# Patient Record
Sex: FEMALE | Race: WHITE | Employment: FULL TIME | URBAN - NONMETROPOLITAN AREA
[De-identification: names, ages, dates, MRNs, and addresses within clinical notes are randomized per-mention and may not be internally consistent; named-entity substitution may affect disease eponyms.]

---

## 2021-05-04 ENCOUNTER — HOSPITAL ENCOUNTER (EMERGENCY)
Age: 41
Discharge: HOME | End: 2021-05-04
Payer: COMMERCIAL

## 2021-05-04 VITALS
OXYGEN SATURATION: 97 % | WEIGHT: 135 LBS | HEART RATE: 95 BPM | BODY MASS INDEX: 21.69 KG/M2 | RESPIRATION RATE: 18 BRPM | TEMPERATURE: 98.9 F | HEIGHT: 66 IN | DIASTOLIC BLOOD PRESSURE: 82 MMHG | SYSTOLIC BLOOD PRESSURE: 131 MMHG

## 2021-05-04 DIAGNOSIS — H10.9 CONJUNCTIVITIS OF RIGHT EYE, UNSPECIFIED CONJUNCTIVITIS TYPE: ICD-10-CM

## 2021-05-04 DIAGNOSIS — J06.9 VIRAL URI WITH COUGH: ICD-10-CM

## 2021-05-04 DIAGNOSIS — Z87.09 PERSONAL HISTORY OF ASTHMA: ICD-10-CM

## 2021-05-04 DIAGNOSIS — R09.82 PND (POST-NASAL DRIP): ICD-10-CM

## 2021-05-04 DIAGNOSIS — J01.00 ACUTE NON-RECURRENT MAXILLARY SINUSITIS: Primary | ICD-10-CM

## 2021-05-04 PROCEDURE — 99203 OFFICE O/P NEW LOW 30 MIN: CPT

## 2021-05-04 PROCEDURE — 99202 OFFICE O/P NEW SF 15 MIN: CPT | Performed by: NURSE PRACTITIONER

## 2021-05-04 PROCEDURE — 99213 OFFICE O/P EST LOW 20 MIN: CPT

## 2021-05-04 RX ORDER — AZITHROMYCIN 250 MG/1
TABLET, FILM COATED ORAL
Qty: 6 TABLET | Refills: 0 | Status: SHIPPED | OUTPATIENT
Start: 2021-05-04 | End: 2021-06-15

## 2021-05-04 RX ORDER — ALBUTEROL SULFATE 90 UG/1
2 AEROSOL, METERED RESPIRATORY (INHALATION) EVERY 4 HOURS PRN
Qty: 1 INHALER | Refills: 0 | Status: SHIPPED | OUTPATIENT
Start: 2021-05-04 | End: 2022-07-05 | Stop reason: SDUPTHER

## 2021-05-04 RX ORDER — MOXIFLOXACIN 5 MG/ML
1 SOLUTION/ DROPS OPHTHALMIC 3 TIMES DAILY
Qty: 1 BOTTLE | Refills: 0 | Status: SHIPPED | OUTPATIENT
Start: 2021-05-04 | End: 2021-05-11

## 2021-05-04 RX ORDER — PREDNISONE 20 MG/1
20 TABLET ORAL DAILY
Qty: 5 TABLET | Refills: 0 | Status: SHIPPED | OUTPATIENT
Start: 2021-05-04 | End: 2021-05-09

## 2021-05-04 RX ORDER — ALPRAZOLAM 0.5 MG/1
0.5 TABLET ORAL 2 TIMES DAILY PRN
COMMUNITY
End: 2021-12-17 | Stop reason: ALTCHOICE

## 2021-05-04 ASSESSMENT — ENCOUNTER SYMPTOMS
EYE DISCHARGE: 1
RHINORRHEA: 1
TROUBLE SWALLOWING: 0
WHEEZING: 0
NAUSEA: 0
VOMITING: 0
SHORTNESS OF BREATH: 0
BACK PAIN: 0
SORE THROAT: 0
COUGH: 1
DIARRHEA: 0
SINUS PRESSURE: 1

## 2021-05-04 ASSESSMENT — PAIN DESCRIPTION - ONSET: ONSET: GRADUAL

## 2021-05-04 ASSESSMENT — VISUAL ACUITY
OD: 20/20
OU: 20/20

## 2021-05-04 ASSESSMENT — PAIN DESCRIPTION - PROGRESSION: CLINICAL_PROGRESSION: GRADUALLY WORSENING

## 2021-05-04 ASSESSMENT — PAIN DESCRIPTION - FREQUENCY: FREQUENCY: INTERMITTENT

## 2021-05-04 ASSESSMENT — PAIN DESCRIPTION - LOCATION: LOCATION: HEAD

## 2021-05-04 ASSESSMENT — PAIN DESCRIPTION - DESCRIPTORS: DESCRIPTORS: PRESSURE

## 2021-05-04 ASSESSMENT — PAIN DESCRIPTION - PAIN TYPE: TYPE: ACUTE PAIN

## 2021-05-04 NOTE — ED NOTES
No change in patients condition. Discharge instructions and prescriptions discussed with pt. Pt. Verbalized understanding of info given and ambulated to exit in stable condition.       Derek Rucker RN  05/04/21 6603

## 2021-06-15 ENCOUNTER — TELEPHONE (OUTPATIENT)
Dept: FAMILY MEDICINE CLINIC | Age: 41
End: 2021-06-15

## 2021-06-15 ENCOUNTER — OFFICE VISIT (OUTPATIENT)
Dept: FAMILY MEDICINE CLINIC | Age: 41
End: 2021-06-15
Payer: COMMERCIAL

## 2021-06-15 VITALS
OXYGEN SATURATION: 99 % | RESPIRATION RATE: 14 BRPM | WEIGHT: 135.6 LBS | DIASTOLIC BLOOD PRESSURE: 82 MMHG | HEART RATE: 86 BPM | HEIGHT: 66 IN | BODY MASS INDEX: 21.79 KG/M2 | SYSTOLIC BLOOD PRESSURE: 124 MMHG | TEMPERATURE: 96.9 F

## 2021-06-15 DIAGNOSIS — G60.9 HEREDITARY AND IDIOPATHIC PERIPHERAL NEUROPATHY: ICD-10-CM

## 2021-06-15 DIAGNOSIS — R53.83 FATIGUE, UNSPECIFIED TYPE: ICD-10-CM

## 2021-06-15 DIAGNOSIS — F41.1 GENERALIZED ANXIETY DISORDER WITH PANIC ATTACKS: ICD-10-CM

## 2021-06-15 DIAGNOSIS — F41.0 GENERALIZED ANXIETY DISORDER WITH PANIC ATTACKS: ICD-10-CM

## 2021-06-15 DIAGNOSIS — Z79.899 CONTROLLED SUBSTANCE AGREEMENT SIGNED: ICD-10-CM

## 2021-06-15 DIAGNOSIS — F33.0 MILD EPISODE OF RECURRENT MAJOR DEPRESSIVE DISORDER (HCC): ICD-10-CM

## 2021-06-15 DIAGNOSIS — N92.6 MISSED MENSES: ICD-10-CM

## 2021-06-15 DIAGNOSIS — Z76.89 ENCOUNTER TO ESTABLISH CARE: Primary | ICD-10-CM

## 2021-06-15 DIAGNOSIS — R10.32 LLQ ABDOMINAL PAIN: ICD-10-CM

## 2021-06-15 PROBLEM — F41.9 ANXIETY: Status: RESOLVED | Noted: 2021-06-15 | Resolved: 2021-06-15

## 2021-06-15 PROBLEM — F41.9 ANXIETY: Status: ACTIVE | Noted: 2021-06-15

## 2021-06-15 LAB
AMPHETAMINE+METHAMPHETAMINE URINE SCREEN: NEGATIVE
BARBITURATE QUANTITATIVE URINE: NEGATIVE
BENZODIAZEPINE QUANTITATIVE URINE: NEGATIVE
CANNABINOID QUANTITATIVE URINE: NEGATIVE
COCAINE METABOLITE QUANTITATIVE URINE: NEGATIVE
OPIATES, URINE: NEGATIVE
OXYCODONE: NEGATIVE
PHENCYCLIDINE QUANTITATIVE URINE: NEGATIVE

## 2021-06-15 PROCEDURE — 99204 OFFICE O/P NEW MOD 45 MIN: CPT | Performed by: NURSE PRACTITIONER

## 2021-06-15 RX ORDER — M-VIT,TX,IRON,MINS/CALC/FOLIC 27MG-0.4MG
1 TABLET ORAL DAILY
COMMUNITY

## 2021-06-15 RX ORDER — PAROXETINE 10 MG/1
10 TABLET, FILM COATED ORAL DAILY
Qty: 30 TABLET | Refills: 3 | Status: SHIPPED | OUTPATIENT
Start: 2021-06-15 | End: 2021-12-17 | Stop reason: ALTCHOICE

## 2021-06-15 SDOH — ECONOMIC STABILITY: FOOD INSECURITY: WITHIN THE PAST 12 MONTHS, THE FOOD YOU BOUGHT JUST DIDN'T LAST AND YOU DIDN'T HAVE MONEY TO GET MORE.: PATIENT DECLINED

## 2021-06-15 SDOH — ECONOMIC STABILITY: FOOD INSECURITY: WITHIN THE PAST 12 MONTHS, YOU WORRIED THAT YOUR FOOD WOULD RUN OUT BEFORE YOU GOT MONEY TO BUY MORE.: PATIENT DECLINED

## 2021-06-15 ASSESSMENT — PATIENT HEALTH QUESTIONNAIRE - PHQ9
2. FEELING DOWN, DEPRESSED OR HOPELESS: 0
SUM OF ALL RESPONSES TO PHQ QUESTIONS 1-9: 0
1. LITTLE INTEREST OR PLEASURE IN DOING THINGS: 0
SUM OF ALL RESPONSES TO PHQ9 QUESTIONS 1 & 2: 0

## 2021-06-15 ASSESSMENT — SOCIAL DETERMINANTS OF HEALTH (SDOH): HOW HARD IS IT FOR YOU TO PAY FOR THE VERY BASICS LIKE FOOD, HOUSING, MEDICAL CARE, AND HEATING?: PATIENT DECLINED

## 2021-06-15 NOTE — PROGRESS NOTES
found for: CHOLHDLRATIO    DM Screen - No results found for: LABA1C  No results found for: EAG  Colon Cancer Screening - 47  Lung Cancer Screening (Age 54 to [de-identified] with 30 pack year hx, current smoker or quit within past 15 years) - n/a    Tetanus - needs  Influenza Vaccine - yearly  Pneumonia Vaccine - 65  Zostavax - 50   HPV Vaccine - n/a    Breast Cancer Screening - 50  Cervical Cancer Screening - per OB  Osteoporosis Screening - 64  Chlamydia Screen - n/a    PSA testing discussion - n/a  AAA Screening - n/a    Falls screening - n/a    Current Outpatient Medications   Medication Sig Dispense Refill    Multiple Vitamins-Minerals (THERAPEUTIC MULTIVITAMIN-MINERALS) tablet Take 1 tablet by mouth daily      PARoxetine (PAXIL) 10 MG tablet Take 1 tablet by mouth daily 30 tablet 3    ALPRAZolam (XANAX) 0.5 MG tablet Take 0.5 mg by mouth 2 times daily as needed for Anxiety.  albuterol sulfate  (90 Base) MCG/ACT inhaler Inhale 2 puffs into the lungs every 4 hours as needed for Wheezing or Shortness of Breath 1 Inhaler 0     No current facility-administered medications for this visit. Orders Placed This Encounter   Medications    PARoxetine (PAXIL) 10 MG tablet     Sig: Take 1 tablet by mouth daily     Dispense:  30 tablet     Refill:  3         All medications reviewed and reconciled, including OTC and herbal medications. Updated list given to patient.        Patient Active Problem List   Diagnosis    Hereditary and idiopathic peripheral neuropathy    Generalized anxiety disorder with panic attacks    Mild episode of recurrent major depressive disorder ([facility] [address]       Past Medical History:   Diagnosis Date    Anxiety     Hereditary and idiopathic peripheral neuropathy 6/15/2021    Neuropathy     feet and legs at night        Past Surgical History:   Procedure Laterality Date    OVARIAN CYST DRAINAGE         Allergies   Allergen Reactions    Other Shortness Of Breath     Cannot remember name of medicine but was an antibiotic \"I just remember what I can take\"        Social History     Socioeconomic History    Marital status:      Spouse name: Not on file    Number of children: Not on file    Years of education: Not on file    Highest education level: Not on file   Occupational History    Not on file   Tobacco Use    Smoking status: Never Smoker    Smokeless tobacco: Never Used   Vaping Use    Vaping Use: Every day    Substances: Nicotine    Devices: Pre-filled or refillable cartridge   Substance and Sexual Activity    Alcohol use: Not Currently    Drug use: Not on file    Sexual activity: Not on file   Other Topics Concern    Not on file   Social History Narrative    Not on file     Social Determinants of Health     Financial Resource Strain: Unknown    Difficulty of Paying Living Expenses: Patient refused   Food Insecurity: Unknown    Worried About [address] in the Last Year: Patient refused    [address] in the Last Year: Patient refused   Transportation Needs:     Lack of Transportation (Medical):      Lack of Transportation (Non-Medical):    Physical Activity:     Days of Exercise per Week:     Minutes of Exercise per Session:    Stress:     Feeling of Stress :    Social Connections:     Frequency of Communication with Friends and Family:     Frequency of Social Gatherings with Friends and Family:     Attends Holiness Services:     Active Member of Clubs or Organizations:     Attends Club or Organization Meetings:     Marital Status:    Intimate Partner Violence:     Fear of Current or Ex-Partner:     Emotionally Abused:     Physically Abused:     Sexually Abused:        Family History   Problem Relation Age of Onset    Thyroid Disease Mother     COPD Father     Asthma Father     Bipolar Disorder Father          I have reviewed the patient's past medical history, past surgical history, allergies, medications, social and family history and I have made updates where appropriate. Review of Systems  Positive responses are highlighted in bold    Constitutional:  Fever, Chills, Night Sweats, Fatigue, Unexpected changes in weight  Eyes:  Eye discharge, Eye pain, Eye redness, Visual disturbances   HENT:  Ear pain, Tinnitus, Nosebleeds, Trouble swallowing, Hearing loss, Sore throat  Cardiovascular:  Chest Pain, Palpitations, Orthopnea, Paroxysmal Nocturnal Dyspnea  Respiratory:  Cough, Wheezing, Shortness of breath, Chest tightness, Apnea  Gastrointestinal:  Nausea, Vomiting, Diarrhea, Constipation, Heartburn, Blood in stool  Genitourinary:  Difficulty or painful urination, Flank pain, Change in frequency, Urgency  Skin:  Color change, Rash, Itching, Wound  Psychiatric:  Hallucinations, Anxiety, Depression, Suicidal ideation  Hematological:  Enlarged glands, Easy bleeding, Easily bruising  Musculoskeletal:  Joint pain, Back pain, Gait problems, Joint swelling, Myalgias  Neurological:  Dizziness, Headaches, Presyncope, Numbness, Seizures, Tremors  Allergy:  Environmental allergies, Food allergies  Endocrine:  Heat Intolerance, Cold Intolerance, Polydipsia, Polyphagia, Polyuria      PHYSICAL EXAM:  Vitals:    06/15/21 1244   BP: 124/82   Pulse: 86   Resp: 14   Temp: 96.9 °F (36.1 °C)   TempSrc: Infrared   SpO2: 99%   Weight: 135 lb 9.6 oz (61.5 kg)   Height: 5' 6\" (1.676 m)     Body mass index is 21.89 kg/m². VS Reviewed  General Appearance: A&O x 3, No acute distress,well developed and well- nourished  Head: normocephalic and atraumatic  Eyes: pupils equal, round, and reactive to light, extraocular eye movements intact, conjunctivae and eye lids without erythema  Neck: supple and non-tender without mass, no thyromegaly or thyroid nodules, no cervical lymphadenopathy  Pulmonary/Chest: clear to auscultation bilaterally- no wheezes, rales or rhonchi, normal air movement, no respiratory distress or retractions  Cardiovascular: S1 and S2 auscultated w/ RRR. No murmurs, rubs, clicks, or gallops, distal pulses intact. Abdomen: soft, mild tenderness LUQ, non-distended, bowl sounds physiologic,  no rebound or guarding, no masses or hernias noted. Liver and spleen without enlargement. Extremities: no cyanosis, clubbing or edema of the lower extremities  Musculoskeletal: No joint swelling or gross deformity   Neuro:  Alert, 5/5 strength globally and symmetrically  Psych: Affect appropriate. Mood normal. Thought process is normal without evidence of depression or psychosis. Good insight and appropriate interaction. Cognition and memory appear to be intact. Skin: warm and dry, no rash or erythema  Lymph:  No cervical, auricular or supraclavicular lymph nodes palpated    ASSESSMENT & PLAN  Elicia Sheth was seen today for new patient, fatigue and abdominal pain. Diagnoses and all orders for this visit:    Encounter to establish care    Hereditary and idiopathic peripheral neuropathy    LLQ abdominal pain  -     HCG, Serum, Qualitative; Future  -     US NON OB TRANSVAGINAL; Future  -     US PELVIS COMPLETE; Future    Missed menses  -     HCG, Serum, Qualitative; Future  -     US NON OB TRANSVAGINAL; Future  -     US PELVIS COMPLETE; Future    Generalized anxiety disorder with panic attacks  -     Urine Drug Screen; Future  -     PARoxetine (PAXIL) 10 MG tablet; Take 1 tablet by mouth daily    Mild episode of recurrent major depressive disorder (HCC)  -     Urine Drug Screen; Future  -     PARoxetine (PAXIL) 10 MG tablet; Take 1 tablet by mouth daily    Controlled substance agreement signed  -     Urine Drug Screen; Future    Fatigue, unspecified type  -     Comprehensive Metabolic Panel; Future  -     CBC With Auto Differential; Future  -     TSH With Reflex Ft4; Future      - obtain labs and pelvic ultrasound, including transvaginal US  - advised her that I will not prescribe Xanax alone for anxiety due to it being highly addictive and abusive.  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 - will start Paxil for anxiety to hopefully decrease the amounts of Xanax she is requiring  - currently not using anything for the neuropathy, does not want Gabapentin due to side effects and it being \"addictive\". Is apparently also using Xanax at night which she states helps with the pain    Return in about 4 weeks (around 7/13/2021) for anxiety/depression. Controlled Substance Monitoring:    Acute and Chronic Pain Monitoring:   RX Monitoring 6/15/2021   Periodic Controlled Substance Monitoring Random urine drug screen sent today. ;Potential drug abuse or diversion identified, see note documentation. DISPOSITION    Return in about 4 weeks (around 7/13/2021) for anxiety/depression. Ani Piedra released without restrictions. PATIENT COUNSELING    Counseling was provided today regarding the following topics: Healthy eating habits, Regular exercise, substance abuse and healthy sleep habits. Ani Piedra received counseling on the following healthy behaviors: medication adherence    Patient given educational materials on: See Attached    I have instructed Ani Piedra to complete a self tracking handout on none and instructed them to bring it with them to her next appointment. Barriers to learning and self management: none    Discussed use, benefit, and side effects of prescribed medications. Barriers to medication compliance addressed. All patient questions answered. Pt voiced understanding.        Electronically signed by SAE Mcmullen CNP on 6/15/2021 at 1:42 PM

## 2021-06-16 ENCOUNTER — TELEPHONE (OUTPATIENT)
Dept: FAMILY MEDICINE CLINIC | Age: 41
End: 2021-06-16

## 2021-06-16 DIAGNOSIS — F41.1 GENERALIZED ANXIETY DISORDER WITH PANIC ATTACKS: Primary | ICD-10-CM

## 2021-06-16 DIAGNOSIS — F41.0 GENERALIZED ANXIETY DISORDER WITH PANIC ATTACKS: Primary | ICD-10-CM

## 2021-06-16 RX ORDER — ALPRAZOLAM 0.5 MG/1
0.5 TABLET ORAL 2 TIMES DAILY PRN
Qty: 60 TABLET | Refills: 0 | Status: SHIPPED | OUTPATIENT
Start: 2021-06-16 | End: 2021-07-16

## 2021-06-16 NOTE — TELEPHONE ENCOUNTER
Conversation: Results  (Oldest Message First)  Nisha Lal, APRN - CNP         6/16/21 8:42 AM  Note     Let Waqas Figueroa know her UDS was clean. Rx for Xanax sent to [facility].  F/u as scheduled

## 2021-06-16 NOTE — TELEPHONE ENCOUNTER
Let April [place] know her UDS was clean. Rx for Xanax sent to [facility].  F/u as scheduled

## 2021-06-17 ENCOUNTER — TELEPHONE (OUTPATIENT)
Dept: FAMILY MEDICINE CLINIC | Age: 41
End: 2021-06-17

## 2021-06-17 DIAGNOSIS — F10.10 ALCOHOL ABUSE: ICD-10-CM

## 2021-06-17 DIAGNOSIS — E53.8 B12 DEFICIENCY: ICD-10-CM

## 2021-06-17 DIAGNOSIS — R74.01 ELEVATED TRANSAMINASE LEVEL: Primary | ICD-10-CM

## 2021-06-17 DIAGNOSIS — D75.89 MACROCYTOSIS WITHOUT ANEMIA: Primary | ICD-10-CM

## 2021-06-17 DIAGNOSIS — R79.89 LOW TSH LEVEL: ICD-10-CM

## 2021-06-17 LAB
ABSOLUTE BASO #: 0 X10E9/L (ref 0–0.2)
ABSOLUTE EOS #: 0 X10E9/L (ref 0–0.4)
ABSOLUTE LYMPH #: 0.6 X10E9/L (ref 1–3.5)
ABSOLUTE MONO #: 0.3 X10E9/L (ref 0–0.9)
ABSOLUTE NEUT #: 2.6 X10E9/L (ref 1.5–6.6)
ALBUMIN SERPL-MCNC: 4.7 G/DL (ref 3.2–5.3)
ALK PHOSPHATASE: 74 U/L (ref 39–130)
ALT SERPL-CCNC: 66 U/L (ref 0–31)
ANION GAP SERPL CALCULATED.3IONS-SCNC: 10 MMOL/L (ref 5–15)
AST SERPL-CCNC: 80 U/L (ref 0–41)
BASOPHILS RELATIVE PERCENT: 0.3 %
BILIRUB SERPL-MCNC: 0.5 MG/DL (ref 0.3–1.2)
BUN BLDV-MCNC: 4 MG/DL (ref 5–23)
CALCIUM SERPL-MCNC: 10 MG/DL (ref 8.5–10.5)
CHLORIDE BLD-SCNC: 102 MMOL/L (ref 98–109)
CO2: 30 MMOL/L (ref 22–32)
CREAT SERPL-MCNC: 0.63 MG/DL (ref 0.4–1)
EGFR AFRICAN AMERICAN: >60 ML/MIN/1.73SQ.M
EGFR IF NONAFRICAN AMERICAN: >60 ML/MIN/1.73SQ.M
EOSINOPHILS RELATIVE PERCENT: 0.9 %
GLUCOSE: 86 MG/DL (ref 65–99)
HCT VFR BLD CALC: 41.7 % (ref 35–47)
HEMOGLOBIN: 14.5 G/DL (ref 11.7–15.5)
LYMPHOCYTE %: 16.7 %
MCH RBC QN AUTO: 36.6 PG (ref 27–34)
MCHC RBC AUTO-ENTMCNC: 34.8 G/DL (ref 32–36)
MCV RBC AUTO: 105 FL (ref 80–100)
MONOCYTES # BLD: 9.1 %
NEUTROPHILS RELATIVE PERCENT: 73 %
PDW BLD-RTO: 12.5 % (ref 11.5–15)
PLATELETS: 187 X10E9/L (ref 150–450)
PMV BLD AUTO: 8.4 FL (ref 7–12)
POTASSIUM SERPL-SCNC: 4.9 MMOL/L (ref 3.5–5)
RBC: 3.96 X10E12/L (ref 3.8–5.2)
SODIUM BLD-SCNC: 142 MMOL/L (ref 134–146)
T4 FREE: 0.67 NG/DL (ref 0.61–1.6)
TOTAL PROTEIN: 7.3 G/DL (ref 6–8)
TSH SERPL DL<=0.05 MIU/L-ACNC: 0.47 UIU/ML (ref 0.49–4.67)
WBC: 3.6 X10E9/L (ref 4–11)

## 2021-06-17 NOTE — TELEPHONE ENCOUNTER
----- Message from SAE Mercedes CNP sent at 6/17/2021  4:21 PM EDT -----  Let [facility] Staff know her liver function tests are elevated. Her RBC are also very large in size. This can be indicative of b12/folic acid deficiencies. I am placing orders to check these levels. Also, how much alcohol does she drink? Her thyroid lab is also very borderline suppressed or low, I would like her to just recheck this level in 3 months to confirm stability.

## 2021-06-18 PROBLEM — F10.10 ALCOHOL ABUSE: Status: ACTIVE | Noted: 2021-06-18

## 2021-06-18 LAB
FOLATE: 22.1 NG/ML
PREGNANCY, SERUM: NEGATIVE
VITAMIN B-12: 153 PG/ML (ref 180–914)

## 2021-06-18 RX ORDER — LANOLIN ALCOHOL/MO/W.PET/CERES
1000 CREAM (GRAM) TOPICAL DAILY
Qty: 90 TABLET | Refills: 1 | Status: SHIPPED | OUTPATIENT
Start: 2021-06-18

## 2021-06-18 NOTE — TELEPHONE ENCOUNTER
[id number] Marleny Liu, this is more consistent with her lab results. I was also looking back over her prior records and saw that she had similar elevation of her liver function tests back as far as 2017. Unfortunately, this amount of alcohol does meet the criteria for women for alcohol abuse, i'll happily refer her to someone for assistance if she would like. In addition, I won't be prescribing any more Xanax because of this. This also violates the controlled substance agreement which she signed stating that she won't drink ETOH with her meds. Because of this, I will have to dismiss her.

## 2021-06-18 NOTE — TELEPHONE ENCOUNTER
Pt notified and says she drinks a couple glasses of wine and maybe a can or 2 of beer a day as well. No liquor. She said she is trying to cut back. Was at the doctors so will call us back to schedule the ultrasound. Wants done at [facility].

## 2021-06-18 NOTE — TELEPHONE ENCOUNTER
[id number] Marleny Liu, well let her know her b12 level is low 153. Folic acid normal. I am starting her on b12 supplement. Did she used to drink more alcohol than 1 bottle of wine on the weekends? The reason i'm asking, is because her liver function tests, b12 level being low and her red blood cells being so large in size are all consistent with alcohol abuse. I would also like to get a liver ultrasound. Order placed.

## 2021-06-21 ENCOUNTER — TELEPHONE (OUTPATIENT)
Dept: FAMILY MEDICINE CLINIC | Age: 41
End: 2021-06-21

## 2021-06-21 NOTE — TELEPHONE ENCOUNTER
Scheduling Ultrasound of the Liver  06/28/21 @ [facility]  @ 8:30am   Arrive @ 8:00 am to 1st floor radiology   Nothing to eat or drink 8 hrs prior to Ultrasound.      To reschedule call [phone] option #1

## 2021-06-30 NOTE — TELEPHONE ENCOUNTER
Patient was a N/S for liver  06.28.2021  Patient has been informed and voiced understanding and states that she will be changing PCP at this point and will let the new PCP handle her care from this point on

## 2021-12-17 ENCOUNTER — HOSPITAL ENCOUNTER (EMERGENCY)
Age: 41
Discharge: HOME | End: 2021-12-17
Payer: COMMERCIAL

## 2021-12-17 VITALS
RESPIRATION RATE: 16 BRPM | SYSTOLIC BLOOD PRESSURE: 146 MMHG | TEMPERATURE: 97.8 F | DIASTOLIC BLOOD PRESSURE: 77 MMHG | WEIGHT: 132 LBS | OXYGEN SATURATION: 98 % | BODY MASS INDEX: 21.31 KG/M2 | HEART RATE: 94 BPM

## 2021-12-17 DIAGNOSIS — J01.10 ACUTE NON-RECURRENT FRONTAL SINUSITIS: Primary | ICD-10-CM

## 2021-12-17 PROCEDURE — 99213 OFFICE O/P EST LOW 20 MIN: CPT

## 2021-12-17 PROCEDURE — 99213 OFFICE O/P EST LOW 20 MIN: CPT | Performed by: NURSE PRACTITIONER

## 2021-12-17 RX ORDER — FLUTICASONE PROPIONATE 50 MCG
1 SPRAY, SUSPENSION (ML) NASAL DAILY
Qty: 16 G | Refills: 0 | Status: SHIPPED | OUTPATIENT
Start: 2021-12-17 | End: 2022-07-05 | Stop reason: SDUPTHER

## 2021-12-17 RX ORDER — PSEUDOEPHEDRINE HYDROCHLORIDE 30 MG/1
30 TABLET ORAL EVERY 4 HOURS PRN
Qty: 42 TABLET | Refills: 0 | Status: SHIPPED | OUTPATIENT
Start: 2021-12-17 | End: 2021-12-24

## 2021-12-17 RX ORDER — AZITHROMYCIN 250 MG/1
TABLET, FILM COATED ORAL
Qty: 1 PACKET | Refills: 0 | Status: SHIPPED | OUTPATIENT
Start: 2021-12-17 | End: 2021-12-21

## 2021-12-17 ASSESSMENT — ENCOUNTER SYMPTOMS
SINUS PRESSURE: 1
SHORTNESS OF BREATH: 0
COUGH: 1
SINUS PAIN: 1
SORE THROAT: 0
VOMITING: 0
NAUSEA: 0

## 2021-12-17 NOTE — ED TRIAGE NOTES
Patient to room with c/o productive cough, head congestion, and sinus pressure beginning five days ago. Declines COVID testing at this time.

## 2021-12-17 NOTE — ED PROVIDER NOTES
[address]  Urgent Care Encounter       CHIEF COMPLAINT       Chief Complaint   Patient presents with    Head Congestion     cough, sinus pressure       Nurses Notes reviewed and I agree except as noted in the HPI. HISTORY OF PRESENT ILLNESS   Angela العراقي is a 38 y.o. female who presents with complaints of head congestion, cough, sinus pain and pressure, and headache started couple days ago. She states this is a new problem. She states she normally gets this every 3 to 4 months. She denies any fever, chills, or shortness of breath. She has tried allergy medications for treatment without any relief. She states she has sick contacts at work. She denies any concern for COVID-19. The history is provided by the patient. REVIEW OF SYSTEMS     Review of Systems   Constitutional: Negative for chills and fever. HENT: Positive for congestion, sinus pressure and sinus pain. Negative for sore throat. Respiratory: Positive for cough. Negative for shortness of breath. Cardiovascular: Negative for chest pain. Gastrointestinal: Negative for nausea and vomiting. Musculoskeletal: Negative for myalgias. Neurological: Positive for headaches. PAST MEDICAL HISTORY         Diagnosis Date    Alcohol abuse 6/18/2021    Anxiety     Hereditary and idiopathic peripheral neuropathy 6/15/2021    Neuropathy     feet and legs at night        SURGICALHISTORY     Patient  has a past surgical history that includes ovarian cyst drainage.     CURRENT MEDICATIONS       Discharge Medication List as of 12/17/2021  8:54 AM      CONTINUE these medications which have NOT CHANGED    Details   vitamin B-12 (CYANOCOBALAMIN) 1000 MCG tablet Take 1 tablet by mouth daily, Disp-90 tablet, R-1Normal      Multiple Vitamins-Minerals (THERAPEUTIC MULTIVITAMIN-MINERALS) tablet Take 1 tablet by mouth dailyHistorical Med      albuterol sulfate  (90 Base) MCG/ACT inhaler Inhale 2 puffs into the lungs every 4 hours as needed for Wheezing or Shortness of Breath, Disp-1 Inhaler, R-0Normal             ALLERGIES     Patient is is allergic to other. Patients   There is no immunization history on file for this patient. FAMILY HISTORY     Patient's family history includes Asthma in her father; Bipolar Disorder in her father; COPD in her father; Thyroid Disease in her mother. SOCIAL HISTORY     Patient  reports that she has never smoked. She has never used smokeless tobacco. She reports previous alcohol use. PHYSICAL EXAM     ED TRIAGE VITALS  BP: (!) 146/77, Temp: 97.8 °F (36.6 °C), Pulse: 94, Resp: 16, SpO2: 98 %,Estimated body mass index is 21.31 kg/m² as calculated from the following:    Height as of 6/15/21: 5' 6\" (1.676 m). Weight as of this encounter: 132 lb (59.9 kg). ,Patient's last menstrual period was 12/10/2021. Physical Exam  Vitals and nursing note reviewed. Constitutional:       General: She is not in acute distress. HENT:      Right Ear: Tympanic membrane, ear canal and external ear normal.      Left Ear: Tympanic membrane, ear canal and external ear normal.      Nose: Congestion and rhinorrhea present. Mouth/Throat:      Mouth: Mucous membranes are moist.      Pharynx: No posterior oropharyngeal erythema. Cardiovascular:      Rate and Rhythm: Normal rate and regular rhythm. Heart sounds: Normal heart sounds. Pulmonary:      Effort: Pulmonary effort is normal.      Breath sounds: Normal breath sounds. No wheezing, rhonchi or rales. Lymphadenopathy:      Cervical: No cervical adenopathy. Skin:     General: Skin is warm and dry. Neurological:      Mental Status: She is alert and oriented to person, place, and time. DIAGNOSTIC RESULTS     Labs:No results found for this visit on 12/17/21.     IMAGING:  None    EKG:  None    URGENT CARE COURSE:     Vitals:    12/17/21 0839   BP: (!) 146/77   Pulse: 94   Resp: 16   Temp: 97.8 °F (36.6 °C)   TempSrc: Temporal   SpO2: 98%   Weight: 132 lb (59.9 kg)       Medications - No data to display       PROCEDURES:  None    FINAL IMPRESSION      1. Acute non-recurrent frontal sinusitis      DISPOSITION/ PLAN   DISPOSITION Decision To Discharge 12/17/2021 08:53:11 AM     Clinical exam consistent with acute frontal sinusitis. Will start patient on azithromycin, Flonase, and decongestant. May take over-the-counter antipyretics as needed for headaches. Recommend follow-up with PCP next week if symptoms worsen or fail to improve. PATIENT REFERRED TO:  No primary care provider on file. No primary physician on file.       DISCHARGE MEDICATIONS:  Discharge Medication List as of 12/17/2021  8:54 AM      START taking these medications    Details   azithromycin (ZITHROMAX Z-GREGORIA) 250 MG tablet Take 2 tablets (500 mg) on Day 1, and then take 1 tablet (250 mg) on days 2 through 5., Disp-1 packet, R-0Normal      fluticasone (FLONASE) 50 MCG/ACT nasal spray 1 spray by Each Nostril route daily, Disp-16 g, R-0Normal      pseudoephedrine (DECONGESTANT) 30 MG tablet Take 1 tablet by mouth every 4 hours as needed for Congestion, Disp-42 tablet, R-0Normal             Discharge Medication List as of 12/17/2021  8:54 AM          Discharge Medication List as of 12/17/2021  8:54 AM          Scharlene Gitelman, APRN - CNP    (Please note that portions of this note were completed with a voice recognition program. Efforts were made to edit the dictations but occasionally words are mis-transcribed.)            Scharlene Gitelman, APRN - CNP  12/17/21 1410

## 2022-07-05 ENCOUNTER — HOSPITAL ENCOUNTER (EMERGENCY)
Age: 42
Discharge: HOME | End: 2022-07-05
Payer: COMMERCIAL

## 2022-07-05 VITALS
RESPIRATION RATE: 18 BRPM | OXYGEN SATURATION: 97 % | HEART RATE: 105 BPM | HEIGHT: 66 IN | DIASTOLIC BLOOD PRESSURE: 87 MMHG | TEMPERATURE: 97.7 F | SYSTOLIC BLOOD PRESSURE: 138 MMHG | WEIGHT: 132 LBS | BODY MASS INDEX: 21.21 KG/M2

## 2022-07-05 DIAGNOSIS — J01.00 ACUTE NON-RECURRENT MAXILLARY SINUSITIS: Primary | ICD-10-CM

## 2022-07-05 PROCEDURE — 99213 OFFICE O/P EST LOW 20 MIN: CPT | Performed by: NURSE PRACTITIONER

## 2022-07-05 PROCEDURE — 99213 OFFICE O/P EST LOW 20 MIN: CPT

## 2022-07-05 RX ORDER — FLUTICASONE PROPIONATE 50 MCG
1 SPRAY, SUSPENSION (ML) NASAL DAILY
Qty: 16 G | Refills: 0 | COMMUNITY
Start: 2022-07-05

## 2022-07-05 RX ORDER — PSEUDOEPHEDRINE HYDROCHLORIDE 30 MG/1
30 TABLET ORAL EVERY 4 HOURS PRN
Refills: 1 | COMMUNITY
Start: 2022-07-05 | End: 2022-07-12

## 2022-07-05 RX ORDER — ALBUTEROL SULFATE 90 UG/1
1 AEROSOL, METERED RESPIRATORY (INHALATION) EVERY 4 HOURS PRN
Qty: 1 EACH | Refills: 0 | Status: SHIPPED | OUTPATIENT
Start: 2022-07-05

## 2022-07-05 RX ORDER — AZITHROMYCIN 250 MG/1
TABLET, FILM COATED ORAL
Qty: 1 PACKET | Refills: 0 | Status: SHIPPED | OUTPATIENT
Start: 2022-07-05 | End: 2022-07-09

## 2022-07-05 ASSESSMENT — ENCOUNTER SYMPTOMS
SHORTNESS OF BREATH: 0
SINUS PRESSURE: 1
SORE THROAT: 0
HOARSE VOICE: 1
COUGH: 1

## 2022-07-05 NOTE — ED NOTES
PT GIVEN DISCHARGE INSTRUCTIONS, VERBALIZES UNDERSTANDING. PT ASSESSMENT UNCHANGED, DISCHARGED IN STABLE CONDITION.         Li Barnes RN  07/05/22 3935
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
## 2023-06-02 ENCOUNTER — HOSPITAL ENCOUNTER (EMERGENCY)
Age: 43
Discharge: HOME | End: 2023-06-02
Payer: COMMERCIAL

## 2023-06-02 VITALS
TEMPERATURE: 98.4 F | WEIGHT: 135 LBS | RESPIRATION RATE: 14 BRPM | HEIGHT: 66 IN | SYSTOLIC BLOOD PRESSURE: 124 MMHG | HEART RATE: 88 BPM | BODY MASS INDEX: 21.69 KG/M2 | DIASTOLIC BLOOD PRESSURE: 70 MMHG | OXYGEN SATURATION: 100 %

## 2023-06-02 DIAGNOSIS — H10.9 BACTERIAL CONJUNCTIVITIS OF RIGHT EYE: ICD-10-CM

## 2023-06-02 DIAGNOSIS — J01.40 ACUTE PANSINUSITIS, RECURRENCE NOT SPECIFIED: Primary | ICD-10-CM

## 2023-06-02 PROCEDURE — 99213 OFFICE O/P EST LOW 20 MIN: CPT | Performed by: NURSE PRACTITIONER

## 2023-06-02 PROCEDURE — 99213 OFFICE O/P EST LOW 20 MIN: CPT

## 2023-06-02 RX ORDER — AMOXICILLIN AND CLAVULANATE POTASSIUM 875; 125 MG/1; MG/1
1 TABLET, FILM COATED ORAL 2 TIMES DAILY
Qty: 14 TABLET | Refills: 0 | Status: SHIPPED | OUTPATIENT
Start: 2023-06-02 | End: 2023-06-05 | Stop reason: SINTOL

## 2023-06-02 RX ORDER — OFLOXACIN 3 MG/ML
2 SOLUTION/ DROPS OPHTHALMIC 4 TIMES DAILY
Qty: 10 ML | Refills: 0 | Status: SHIPPED | OUTPATIENT
Start: 2023-06-02 | End: 2023-06-12

## 2023-06-02 ASSESSMENT — PAIN DESCRIPTION - ORIENTATION: ORIENTATION: RIGHT

## 2023-06-02 ASSESSMENT — ENCOUNTER SYMPTOMS
EYE PAIN: 1
SHORTNESS OF BREATH: 0
COUGH: 0
SINUS PRESSURE: 1
DIARRHEA: 0
VOMITING: 0
CHEST TIGHTNESS: 0
NAUSEA: 0
EYE REDNESS: 1
SORE THROAT: 1
EYE DISCHARGE: 1
RHINORRHEA: 1

## 2023-06-02 ASSESSMENT — PAIN DESCRIPTION - DESCRIPTORS: DESCRIPTORS: DISCOMFORT

## 2023-06-02 ASSESSMENT — PAIN - FUNCTIONAL ASSESSMENT: PAIN_FUNCTIONAL_ASSESSMENT: 0-10

## 2023-06-02 ASSESSMENT — PAIN DESCRIPTION - FREQUENCY: FREQUENCY: CONTINUOUS

## 2023-06-02 ASSESSMENT — PAIN DESCRIPTION - LOCATION: LOCATION: EYE

## 2023-06-02 ASSESSMENT — PAIN SCALES - GENERAL: PAINLEVEL_OUTOF10: 5

## 2023-06-02 ASSESSMENT — PAIN DESCRIPTION - PAIN TYPE: TYPE: ACUTE PAIN

## 2023-06-02 NOTE — ED PROVIDER NOTES
[address]  Urgent Care Encounter       CHIEF COMPLAINT       Chief Complaint   Patient presents with    Red Eye     Crusted eye and redness onset yesterday       Nurses Notes reviewed and I agree except as noted in the HPI. HISTORY OF PRESENT ILLNESS   Teto Castro is a 36 y.o. female who presents to the [facility] ambulatory care center for evaluation of right eye erythema. She reports symptoms started yesterday. She does report that her child had pinkeye several days ago. She reports associated congestion, rhinorrhea, postnasal drainage. She does report sinus pressure. The history is provided by the patient. No  was used. REVIEW OF SYSTEMS     Review of Systems   Constitutional:  Negative for activity change, appetite change, chills, fatigue and fever. HENT:  Positive for congestion, rhinorrhea, sinus pressure and sore throat. Negative for ear discharge and ear pain. Eyes:  Positive for pain, discharge and redness. Respiratory:  Negative for cough, chest tightness and shortness of breath. Cardiovascular:  Negative for chest pain. Gastrointestinal:  Negative for diarrhea, nausea and vomiting. Genitourinary:  Negative for dysuria. Skin:  Negative for rash. Allergic/Immunologic: Negative for environmental allergies and food allergies. Neurological:  Negative for dizziness and headaches. PAST MEDICAL HISTORY         Diagnosis Date    Alcohol abuse 6/18/2021    Anxiety     Hereditary and idiopathic peripheral neuropathy 6/15/2021    Neuropathy     feet and legs at night        SURGICALHISTORY     Patient  has a past surgical history that includes ovarian cyst drainage.     CURRENT MEDICATIONS       Discharge Medication List as of 6/2/2023  9:35 AM        CONTINUE these medications which have NOT CHANGED    Details   albuterol sulfate HFA (PROVENTIL;VENTOLIN;PROAIR) 108 (90 Base) MCG/ACT inhaler Inhale 1 puff into the lungs every 4 hours as

## 2023-06-02 NOTE — ED NOTES
Patient discharge instructions given to pt and pt verbalized understanding, 2 px given, no other needs at this time, and pt left in stable condition.       Toño Neal RN  06/02/23 8009

## 2023-06-02 NOTE — ED TRIAGE NOTES
Patient walked to room 7 for right eye redness and crusted shut yesterday. Patient also had swelling below her eye and warm to touch.

## 2023-06-05 RX ORDER — AMOXICILLIN 500 MG/1
500 CAPSULE ORAL 2 TIMES DAILY
Qty: 14 CAPSULE | Refills: 0 | Status: SHIPPED | OUTPATIENT
Start: 2023-06-05 | End: 2023-06-12

## 2023-07-22 ENCOUNTER — APPOINTMENT (OUTPATIENT)
Dept: GENERAL RADIOLOGY | Age: 43
End: 2023-07-22
Payer: COMMERCIAL

## 2023-07-22 ENCOUNTER — HOSPITAL ENCOUNTER (EMERGENCY)
Age: 43
Discharge: HOME | End: 2023-07-22
Payer: COMMERCIAL

## 2023-07-22 VITALS
TEMPERATURE: 98.1 F | BODY MASS INDEX: 21.63 KG/M2 | OXYGEN SATURATION: 98 % | DIASTOLIC BLOOD PRESSURE: 73 MMHG | HEART RATE: 96 BPM | WEIGHT: 134 LBS | SYSTOLIC BLOOD PRESSURE: 116 MMHG | RESPIRATION RATE: 16 BRPM

## 2023-07-22 DIAGNOSIS — S92.401A CLOSED DISPLACED FRACTURE OF PHALANX OF RIGHT GREAT TOE, UNSPECIFIED PHALANX, INITIAL ENCOUNTER: Primary | ICD-10-CM

## 2023-07-22 PROCEDURE — 99213 OFFICE O/P EST LOW 20 MIN: CPT | Performed by: NURSE PRACTITIONER

## 2023-07-22 PROCEDURE — 99213 OFFICE O/P EST LOW 20 MIN: CPT

## 2023-07-22 PROCEDURE — 73630 X-RAY EXAM OF FOOT: CPT

## 2023-07-22 RX ORDER — IBUPROFEN 800 MG/1
800 TABLET ORAL EVERY 8 HOURS PRN
Qty: 30 TABLET | Refills: 0 | Status: SHIPPED | OUTPATIENT
Start: 2023-07-22 | End: 2023-08-01

## 2023-07-22 ASSESSMENT — ENCOUNTER SYMPTOMS
COUGH: 0
DIARRHEA: 0
RHINORRHEA: 0
EYE DISCHARGE: 0
NAUSEA: 0
EYE REDNESS: 0
TROUBLE SWALLOWING: 0
SHORTNESS OF BREATH: 0
SORE THROAT: 0
VOMITING: 0

## 2023-07-22 ASSESSMENT — PAIN - FUNCTIONAL ASSESSMENT
PAIN_FUNCTIONAL_ASSESSMENT: PREVENTS OR INTERFERES SOME ACTIVE ACTIVITIES AND ADLS
PAIN_FUNCTIONAL_ASSESSMENT: 0-10

## 2023-07-22 ASSESSMENT — PAIN DESCRIPTION - PAIN TYPE: TYPE: ACUTE PAIN

## 2023-07-22 ASSESSMENT — PAIN DESCRIPTION - DESCRIPTORS: DESCRIPTORS: THROBBING

## 2023-07-22 ASSESSMENT — PAIN SCALES - GENERAL: PAINLEVEL_OUTOF10: 3

## 2023-07-22 ASSESSMENT — PAIN DESCRIPTION - LOCATION: LOCATION: FOOT

## 2023-07-22 ASSESSMENT — PAIN DESCRIPTION - ORIENTATION: ORIENTATION: RIGHT

## 2023-07-22 NOTE — DISCHARGE INSTRUCTIONS
Take medications as prescribed. You may use Tylenol or Motrin per package instructions for pain. You may use a heating pad or ice for 15 minutes 2-3 times daily as needed for comfort. Seek emergency treatment for fever greater than 101.5 for greater than 3 days, increased pain, or new or unusual symptoms.

## 2023-10-15 ENCOUNTER — HOSPITAL ENCOUNTER (OUTPATIENT)
Age: 43
Setting detail: OBSERVATION
Discharge: HOME | End: 2023-10-15
Attending: STUDENT IN AN ORGANIZED HEALTH CARE EDUCATION/TRAINING PROGRAM | Admitting: SURGERY
Payer: COMMERCIAL

## 2023-10-15 ENCOUNTER — APPOINTMENT (OUTPATIENT)
Dept: GENERAL RADIOLOGY | Age: 43
End: 2023-10-15
Payer: COMMERCIAL

## 2023-10-15 ENCOUNTER — APPOINTMENT (OUTPATIENT)
Dept: CT IMAGING | Age: 43
End: 2023-10-15
Payer: COMMERCIAL

## 2023-10-15 VITALS
OXYGEN SATURATION: 97 % | BODY MASS INDEX: 22.36 KG/M2 | WEIGHT: 139.11 LBS | TEMPERATURE: 97.7 F | HEART RATE: 84 BPM | RESPIRATION RATE: 18 BRPM | DIASTOLIC BLOOD PRESSURE: 66 MMHG | HEIGHT: 66 IN | SYSTOLIC BLOOD PRESSURE: 115 MMHG
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
## 2023-10-15 DIAGNOSIS — S01.01XA LACERATION OF SCALP WITHOUT FOREIGN BODY, INITIAL ENCOUNTER: Primary | ICD-10-CM

## 2023-10-15 PROBLEM — R58 ARTERIAL HEMORRHAGE: Status: ACTIVE | Noted: 2023-10-15

## 2023-10-15 LAB
ABO: NORMAL
AMPHETAMINES UR QL SCN: NEGATIVE
ANION GAP SERPL CALC-SCNC: 15 MEQ/L (ref 8–16)
ANION GAP SERPL CALC-SCNC: 16 MEQ/L (ref 8–16)
ANTIBODY SCREEN: NORMAL
APTT PPP: 28 SECONDS (ref 22–38)
BARBITURATES UR QL SCN: NEGATIVE
BASOPHILS ABSOLUTE: 0 THOU/MM3 (ref 0–0.1)
BASOPHILS ABSOLUTE: 0 THOU/MM3 (ref 0–0.1)
BASOPHILS NFR BLD AUTO: 0.3 %
BASOPHILS NFR BLD AUTO: 0.4 %
BENZODIAZ UR QL SCN: NEGATIVE
BUN SERPL-MCNC: 3 MG/DL (ref 7–22)
BUN SERPL-MCNC: 4 MG/DL (ref 7–22)
BZE UR QL SCN: NEGATIVE
CALCIUM SERPL-MCNC: 7.5 MG/DL (ref 8.5–10.5)
CALCIUM SERPL-MCNC: 8.3 MG/DL (ref 8.5–10.5)
CANNABINOIDS UR QL SCN: NEGATIVE
CHLORIDE SERPL-SCNC: 108 MEQ/L (ref 98–111)
CHLORIDE SERPL-SCNC: 99 MEQ/L (ref 98–111)
CO2 SERPL-SCNC: 20 MEQ/L (ref 23–33)
CO2 SERPL-SCNC: 23 MEQ/L (ref 23–33)
CREAT SERPL-MCNC: 0.5 MG/DL (ref 0.4–1.2)
CREAT SERPL-MCNC: 0.6 MG/DL (ref 0.4–1.2)
DEPRECATED RDW RBC AUTO: 45.6 FL (ref 35–45)
DEPRECATED RDW RBC AUTO: 48.3 FL (ref 35–45)
EOSINOPHIL NFR BLD AUTO: 0 %
EOSINOPHIL NFR BLD AUTO: 1 %
EOSINOPHILS ABSOLUTE: 0 THOU/MM3 (ref 0–0.4)
EOSINOPHILS ABSOLUTE: 0.1 THOU/MM3 (ref 0–0.4)
ERYTHROCYTE [DISTWIDTH] IN BLOOD BY AUTOMATED COUNT: 12.3 % (ref 11.5–14.5)
ERYTHROCYTE [DISTWIDTH] IN BLOOD BY AUTOMATED COUNT: 12.3 % (ref 11.5–14.5)
ETHANOL SERPL-MCNC: 0.19 %
ETHANOL SERPL-MCNC: 0.43 %
FENTANYL: NEGATIVE
GFR SERPL CREATININE-BSD FRML MDRD: > 60 ML/MIN/1.73M2
GFR SERPL CREATININE-BSD FRML MDRD: > 60 ML/MIN/1.73M2
GLUCOSE SERPL-MCNC: 110 MG/DL (ref 70–108)
GLUCOSE SERPL-MCNC: 125 MG/DL (ref 70–108)
HCT VFR BLD AUTO: 31.6 % (ref 37–47)
HCT VFR BLD AUTO: 42.7 % (ref 37–47)
HGB BLD-MCNC: 10.5 GM/DL (ref 12–16)
HGB BLD-MCNC: 14.7 GM/DL (ref 12–16)
IMM GRANULOCYTES # BLD AUTO: 0.04 THOU/MM3 (ref 0–0.07)
IMM GRANULOCYTES # BLD AUTO: 0.04 THOU/MM3 (ref 0–0.07)
IMM GRANULOCYTES NFR BLD AUTO: 0.4 %
IMM GRANULOCYTES NFR BLD AUTO: 0.6 %
INR PPP: 0.93 (ref 0.85–1.13)
LYMPHOCYTES ABSOLUTE: 1 THOU/MM3 (ref 1–4.8)
LYMPHOCYTES ABSOLUTE: 2.8 THOU/MM3 (ref 1–4.8)
LYMPHOCYTES NFR BLD AUTO: 15.3 %
LYMPHOCYTES NFR BLD AUTO: 27 %
MCH RBC QN AUTO: 35.1 PG (ref 26–33)
MCH RBC QN AUTO: 35.5 PG (ref 26–33)
MCHC RBC AUTO-ENTMCNC: 33.2 GM/DL (ref 32.2–35.5)
MCHC RBC AUTO-ENTMCNC: 34.4 GM/DL (ref 32.2–35.5)
MCV RBC AUTO: 101.9 FL (ref 81–99)
MCV RBC AUTO: 106.8 FL (ref 81–99)
MONOCYTES ABSOLUTE: 0.3 THOU/MM3 (ref 0.4–1.3)
MONOCYTES ABSOLUTE: 0.5 THOU/MM3 (ref 0.4–1.3)
MONOCYTES NFR BLD AUTO: 4.9 %
MONOCYTES NFR BLD AUTO: 4.9 %
NEUTROPHILS NFR BLD AUTO: 66.3 %
NEUTROPHILS NFR BLD AUTO: 78.9 %
NRBC BLD AUTO-RTO: 0 /100 WBC
NRBC BLD AUTO-RTO: 0 /100 WBC
OPIATES UR QL SCN: NEGATIVE
OSMOLALITY SERPL CALC.SUM OF ELEC: 273.2 MOSMOL/KG (ref 275–300)
OXYCODONE: NEGATIVE
PCP UR QL SCN: NEGATIVE
PLATELET # BLD AUTO: 155 THOU/MM3 (ref 130–400)
PLATELET # BLD AUTO: 223 THOU/MM3 (ref 130–400)
PMV BLD AUTO: 9.6 FL (ref 9.4–12.4)
PMV BLD AUTO: 9.8 FL (ref 9.4–12.4)
POTASSIUM SERPL-SCNC: 3.2 MEQ/L (ref 3.5–5.2)
POTASSIUM SERPL-SCNC: 4.1 MEQ/L (ref 3.5–5.2)
RBC # BLD AUTO: 2.96 MILL/MM3 (ref 4.2–5.4)
RBC # BLD AUTO: 4.19 MILL/MM3 (ref 4.2–5.4)
RH FACTOR: NORMAL
SEGMENTED NEUTROPHILS ABSOLUTE COUNT: 5.3 THOU/MM3 (ref 1.8–7.7)
SEGMENTED NEUTROPHILS ABSOLUTE COUNT: 7 THOU/MM3 (ref 1.8–7.7)
SODIUM SERPL-SCNC: 138 MEQ/L (ref 135–145)
SODIUM SERPL-SCNC: 143 MEQ/L (ref 135–145)
WBC # BLD AUTO: 10.5 THOU/MM3 (ref 4.8–10.8)
WBC # BLD AUTO: 6.7 THOU/MM3 (ref 4.8–10.8)

## 2023-10-15 PROCEDURE — 72125 CT NECK SPINE W/O DYE: CPT

## 2023-10-15 PROCEDURE — 80307 DRUG TEST PRSMV CHEM ANLYZR: CPT

## 2023-10-15 PROCEDURE — 96374 THER/PROPH/DIAG INJ IV PUSH: CPT

## 2023-10-15 PROCEDURE — 86850 RBC ANTIBODY SCREEN: CPT

## 2023-10-15 PROCEDURE — 6370000000 HC RX 637 (ALT 250 FOR IP): Performed by: OCCUPATIONAL THERAPIST

## 2023-10-15 PROCEDURE — 6370000000 HC RX 637 (ALT 250 FOR IP): Performed by: STUDENT IN AN ORGANIZED HEALTH CARE EDUCATION/TRAINING PROGRAM

## 2023-10-15 PROCEDURE — 86901 BLOOD TYPING SEROLOGIC RH(D): CPT

## 2023-10-15 PROCEDURE — 36415 COLL VENOUS BLD VENIPUNCTURE: CPT

## 2023-10-15 PROCEDURE — 94640 AIRWAY INHALATION TREATMENT: CPT

## 2023-10-15 PROCEDURE — 70450 CT HEAD/BRAIN W/O DYE: CPT

## 2023-10-15 PROCEDURE — 2500000003 HC RX 250 WO HCPCS: Performed by: OCCUPATIONAL THERAPIST

## 2023-10-15 PROCEDURE — 2500000003 HC RX 250 WO HCPCS

## 2023-10-15 PROCEDURE — G0378 HOSPITAL OBSERVATION PER HR: HCPCS

## 2023-10-15 PROCEDURE — 96360 HYDRATION IV INFUSION INIT: CPT

## 2023-10-15 PROCEDURE — 72170 X-RAY EXAM OF PELVIS: CPT

## 2023-10-15 PROCEDURE — 2580000003 HC RX 258: Performed by: STUDENT IN AN ORGANIZED HEALTH CARE EDUCATION/TRAINING PROGRAM

## 2023-10-15 PROCEDURE — 71045 X-RAY EXAM CHEST 1 VIEW: CPT

## 2023-10-15 PROCEDURE — 85610 PROTHROMBIN TIME: CPT

## 2023-10-15 PROCEDURE — 2580000003 HC RX 258: Performed by: OCCUPATIONAL THERAPIST

## 2023-10-15 PROCEDURE — APPNB45 APP NON BILLABLE 31-45 MINUTES: Performed by: OCCUPATIONAL THERAPIST

## 2023-10-15 PROCEDURE — 99285 EMERGENCY DEPT VISIT HI MDM: CPT

## 2023-10-15 PROCEDURE — A4216 STERILE WATER/SALINE, 10 ML: HCPCS | Performed by: OCCUPATIONAL THERAPIST

## 2023-10-15 PROCEDURE — 86900 BLOOD TYPING SEROLOGIC ABO: CPT

## 2023-10-15 PROCEDURE — 96361 HYDRATE IV INFUSION ADD-ON: CPT

## 2023-10-15 PROCEDURE — 80048 BASIC METABOLIC PNL TOTAL CA: CPT

## 2023-10-15 PROCEDURE — 82077 ASSAY SPEC XCP UR&BREATH IA: CPT

## 2023-10-15 PROCEDURE — 85730 THROMBOPLASTIN TIME PARTIAL: CPT

## 2023-10-15 PROCEDURE — 85025 COMPLETE CBC W/AUTO DIFF WBC: CPT

## 2023-10-15 PROCEDURE — 12001 RPR S/N/AX/GEN/TRNK 2.5CM/<: CPT

## 2023-10-15 PROCEDURE — 6820000003 HC L2 TRAUMA ALERT ACTIVATION: Performed by: SURGERY

## 2023-10-15 RX ORDER — SODIUM CHLORIDE 0.9 % (FLUSH) 0.9 %
5-40 SYRINGE (ML) INJECTION EVERY 12 HOURS SCHEDULED
Status: DISCONTINUED | OUTPATIENT
Start: 2023-10-15 | End: 2023-10-15 | Stop reason: HOSPADM

## 2023-10-15 RX ORDER — TRANEXAMIC ACID 100 MG/ML
INJECTION, SOLUTION INTRAVENOUS
Status: COMPLETED
Start: 2023-10-15 | End: 2023-10-15

## 2023-10-15 RX ORDER — LORAZEPAM 1 MG/1
4 TABLET ORAL
Status: DISCONTINUED | OUTPATIENT
Start: 2023-10-15 | End: 2023-10-15 | Stop reason: HOSPADM

## 2023-10-15 RX ORDER — TRAMADOL HYDROCHLORIDE 50 MG/1
50 TABLET ORAL EVERY 4 HOURS PRN
Status: DISCONTINUED | OUTPATIENT
Start: 2023-10-15 | End: 2023-10-15

## 2023-10-15 RX ORDER — ONDANSETRON 2 MG/ML
4 INJECTION INTRAMUSCULAR; INTRAVENOUS EVERY 6 HOURS PRN
Status: DISCONTINUED | OUTPATIENT
Start: 2023-10-15 | End: 2023-10-15 | Stop reason: HOSPADM

## 2023-10-15 RX ORDER — SENNOSIDES A AND B 8.6 MG/1
1 TABLET, FILM COATED ORAL DAILY PRN
Status: DISCONTINUED | OUTPATIENT
Start: 2023-10-15 | End: 2023-10-15 | Stop reason: HOSPADM

## 2023-10-15 RX ORDER — TRAMADOL HYDROCHLORIDE 50 MG/1
50 TABLET ORAL EVERY 8 HOURS PRN
Qty: 21 TABLET | Refills: 0 | Status: SHIPPED | OUTPATIENT
Start: 2023-10-15 | End: 2023-10-22

## 2023-10-15 RX ORDER — SODIUM CHLORIDE 0.9 % (FLUSH) 0.9 %
5-40 SYRINGE (ML) INJECTION PRN
Status: DISCONTINUED | OUTPATIENT
Start: 2023-10-15 | End: 2023-10-15 | Stop reason: HOSPADM

## 2023-10-15 RX ORDER — NICOTINE 21 MG/24HR
1 PATCH, TRANSDERMAL 24 HOURS TRANSDERMAL ONCE
Status: DISCONTINUED | OUTPATIENT
Start: 2023-10-15 | End: 2023-10-15 | Stop reason: HOSPADM

## 2023-10-15 RX ORDER — SODIUM CHLORIDE, SODIUM LACTATE, POTASSIUM CHLORIDE, AND CALCIUM CHLORIDE .6; .31; .03; .02 G/100ML; G/100ML; G/100ML; G/100ML
1000 INJECTION, SOLUTION INTRAVENOUS ONCE
Status: DISCONTINUED | OUTPATIENT
Start: 2023-10-15 | End: 2023-10-15

## 2023-10-15 RX ORDER — SODIUM CHLORIDE 9 MG/ML
INJECTION, SOLUTION INTRAVENOUS PRN
Status: DISCONTINUED | OUTPATIENT
Start: 2023-10-15 | End: 2023-10-15 | Stop reason: HOSPADM

## 2023-10-15 RX ORDER — SODIUM CHLORIDE 9 MG/ML
INJECTION, SOLUTION INTRAVENOUS CONTINUOUS
Status: DISCONTINUED | OUTPATIENT
Start: 2023-10-15 | End: 2023-10-15 | Stop reason: HOSPADM

## 2023-10-15 RX ORDER — ALBUTEROL SULFATE 90 UG/1
1 AEROSOL, METERED RESPIRATORY (INHALATION) EVERY 4 HOURS PRN
Status: DISCONTINUED | OUTPATIENT
Start: 2023-10-15 | End: 2023-10-15 | Stop reason: HOSPADM

## 2023-10-15 RX ORDER — LORAZEPAM 1 MG/1
1 TABLET ORAL
Status: DISCONTINUED | OUTPATIENT
Start: 2023-10-15 | End: 2023-10-15 | Stop reason: HOSPADM

## 2023-10-15 RX ORDER — TRAMADOL HYDROCHLORIDE 50 MG/1
100 TABLET ORAL EVERY 6 HOURS PRN
Status: DISCONTINUED | OUTPATIENT
Start: 2023-10-15 | End: 2023-10-15

## 2023-10-15 RX ORDER — SODIUM CHLORIDE, SODIUM LACTATE, POTASSIUM CHLORIDE, AND CALCIUM CHLORIDE .6; .31; .03; .02 G/100ML; G/100ML; G/100ML; G/100ML
1000 INJECTION, SOLUTION INTRAVENOUS ONCE
Status: COMPLETED | OUTPATIENT
Start: 2023-10-15 | End: 2023-10-15

## 2023-10-15 RX ORDER — ONDANSETRON 4 MG/1
4 TABLET, ORALLY DISINTEGRATING ORAL EVERY 8 HOURS PRN
Status: DISCONTINUED | OUTPATIENT
Start: 2023-10-15 | End: 2023-10-15 | Stop reason: HOSPADM

## 2023-10-15 RX ORDER — METHOCARBAMOL 500 MG/1
1000 TABLET, FILM COATED ORAL 4 TIMES DAILY PRN
Status: DISCONTINUED | OUTPATIENT
Start: 2023-10-15 | End: 2023-10-15 | Stop reason: HOSPADM

## 2023-10-15 RX ORDER — MULTIVITAMIN WITH IRON
1 TABLET ORAL DAILY
Status: DISCONTINUED | OUTPATIENT
Start: 2023-10-15 | End: 2023-10-15 | Stop reason: HOSPADM

## 2023-10-15 RX ORDER — TRANEXAMIC ACID 100 MG/ML
1000 INJECTION, SOLUTION INTRAVENOUS ONCE
Status: COMPLETED | OUTPATIENT
Start: 2023-10-15 | End: 2023-10-15

## 2023-10-15 RX ORDER — ACETAMINOPHEN 325 MG/1
650 TABLET ORAL EVERY 4 HOURS PRN
Status: DISCONTINUED | OUTPATIENT
Start: 2023-10-15 | End: 2023-10-15 | Stop reason: HOSPADM

## 2023-10-15 RX ORDER — LORAZEPAM 2 MG/ML
1 INJECTION INTRAMUSCULAR
Status: DISCONTINUED | OUTPATIENT
Start: 2023-10-15 | End: 2023-10-15 | Stop reason: HOSPADM

## 2023-10-15 RX ORDER — POTASSIUM CHLORIDE 7.45 MG/ML
10 INJECTION INTRAVENOUS PRN
Status: DISCONTINUED | OUTPATIENT
Start: 2023-10-15 | End: 2023-10-15 | Stop reason: HOSPADM

## 2023-10-15 RX ORDER — 0.9 % SODIUM CHLORIDE 0.9 %
1000 INTRAVENOUS SOLUTION INTRAVENOUS ONCE
Status: COMPLETED | OUTPATIENT
Start: 2023-10-15 | End: 2023-10-15

## 2023-10-15 RX ORDER — LORAZEPAM 2 MG/ML
3 INJECTION INTRAMUSCULAR
Status: DISCONTINUED | OUTPATIENT
Start: 2023-10-15 | End: 2023-10-15 | Stop reason: HOSPADM

## 2023-10-15 RX ORDER — POTASSIUM CHLORIDE 20 MEQ/1
40 TABLET, EXTENDED RELEASE ORAL PRN
Status: DISCONTINUED | OUTPATIENT
Start: 2023-10-15 | End: 2023-10-15 | Stop reason: HOSPADM

## 2023-10-15 RX ORDER — LORAZEPAM 2 MG/ML
2 INJECTION INTRAMUSCULAR
Status: DISCONTINUED | OUTPATIENT
Start: 2023-10-15 | End: 2023-10-15 | Stop reason: HOSPADM

## 2023-10-15 RX ORDER — LORAZEPAM 2 MG/ML
4 INJECTION INTRAMUSCULAR
Status: DISCONTINUED | OUTPATIENT
Start: 2023-10-15 | End: 2023-10-15 | Stop reason: HOSPADM

## 2023-10-15 RX ORDER — POLYETHYLENE GLYCOL 3350 17 G/17G
17 POWDER, FOR SOLUTION ORAL DAILY
Status: DISCONTINUED | OUTPATIENT
Start: 2023-10-15 | End: 2023-10-15 | Stop reason: HOSPADM

## 2023-10-15 RX ORDER — LORAZEPAM 1 MG/1
3 TABLET ORAL
Status: DISCONTINUED | OUTPATIENT
Start: 2023-10-15 | End: 2023-10-15 | Stop reason: HOSPADM

## 2023-10-15 RX ORDER — LORAZEPAM 1 MG/1
2 TABLET ORAL
Status: DISCONTINUED | OUTPATIENT
Start: 2023-10-15 | End: 2023-10-15 | Stop reason: HOSPADM

## 2023-10-15 RX ORDER — TRAMADOL HYDROCHLORIDE 50 MG/1
50 TABLET ORAL EVERY 8 HOURS PRN
Qty: 21 TABLET | Refills: 0 | Status: SHIPPED | OUTPATIENT
Start: 2023-10-15 | End: 2023-10-15 | Stop reason: SDUPTHER

## 2023-10-15 RX ADMIN — FAMOTIDINE 20 MG: 10 INJECTION, SOLUTION INTRAVENOUS at 08:24

## 2023-10-15 RX ADMIN — TRAMADOL HYDROCHLORIDE 50 MG: 50 TABLET, COATED ORAL at 12:57

## 2023-10-15 RX ADMIN — SODIUM CHLORIDE: 9 INJECTION, SOLUTION INTRAVENOUS at 03:33

## 2023-10-15 RX ADMIN — SODIUM CHLORIDE 1000 ML: 9 INJECTION, SOLUTION INTRAVENOUS at 00:51

## 2023-10-15 RX ADMIN — Medication 1 TABLET: at 08:24

## 2023-10-15 RX ADMIN — TRANEXAMIC ACID 1000 MG: 100 INJECTION, SOLUTION INTRAVENOUS at 01:05

## 2023-10-15 RX ADMIN — SODIUM CHLORIDE, POTASSIUM CHLORIDE, SODIUM LACTATE AND CALCIUM CHLORIDE 1000 ML: 600; 310; 30; 20 INJECTION, SOLUTION INTRAVENOUS at 02:20

## 2023-10-15 RX ADMIN — TRAMADOL HYDROCHLORIDE 50 MG: 50 TABLET, COATED ORAL at 03:56

## 2023-10-15 RX ADMIN — POTASSIUM CHLORIDE 40 MEQ: 1500 TABLET, EXTENDED RELEASE ORAL at 03:56

## 2023-10-15 RX ADMIN — ALBUTEROL SULFATE 1 PUFF: 90 AEROSOL, METERED RESPIRATORY (INHALATION) at 05:02

## 2023-10-15 ASSESSMENT — ENCOUNTER SYMPTOMS
EYE PAIN: 0
NAUSEA: 0
COUGH: 0
VOMITING: 0
BACK PAIN: 0
COLOR CHANGE: 0
SHORTNESS OF BREATH: 0
ABDOMINAL PAIN: 0

## 2023-10-15 ASSESSMENT — PAIN DESCRIPTION - ORIENTATION
ORIENTATION: MID
ORIENTATION: MID

## 2023-10-15 ASSESSMENT — PAIN DESCRIPTION - FREQUENCY: FREQUENCY: CONTINUOUS

## 2023-10-15 ASSESSMENT — PAIN SCALES - GENERAL
PAINLEVEL_OUTOF10: 7
PAINLEVEL_OUTOF10: 5
PAINLEVEL_OUTOF10: 8
PAINLEVEL_OUTOF10: 7

## 2023-10-15 ASSESSMENT — PAIN DESCRIPTION - LOCATION
LOCATION: HEAD
LOCATION: HEAD

## 2023-10-15 ASSESSMENT — PAIN DESCRIPTION - ONSET: ONSET: ON-GOING

## 2023-10-15 ASSESSMENT — PAIN - FUNCTIONAL ASSESSMENT
PAIN_FUNCTIONAL_ASSESSMENT: NONE - DENIES PAIN
PAIN_FUNCTIONAL_ASSESSMENT: NONE - DENIES PAIN
PAIN_FUNCTIONAL_ASSESSMENT: ACTIVITIES ARE NOT PREVENTED
PAIN_FUNCTIONAL_ASSESSMENT: PREVENTS OR INTERFERES SOME ACTIVE ACTIVITIES AND ADLS
PAIN_FUNCTIONAL_ASSESSMENT: NONE - DENIES PAIN

## 2023-10-15 ASSESSMENT — PAIN DESCRIPTION - DESCRIPTORS
DESCRIPTORS: ACHING
DESCRIPTORS: ACHING

## 2023-10-15 ASSESSMENT — PAIN DESCRIPTION - PAIN TYPE: TYPE: ACUTE PAIN
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
## 2023-10-15 NOTE — ED TRIAGE NOTES
Pt comes to ED with c/o head laceration. Pt states that she fell and hit her head on a step. Bleeding is uncontrolled on arrival. Pt denies LOC. Pt denies blood thinners. Pt is alert and oriented x4. Respirations even and unlabored. Pt states that she had 5 alcoholic beverages.

## 2023-10-15 NOTE — PLAN OF CARE
Problem: Discharge Planning  Goal: Discharge to home or other facility with appropriate resources  Outcome: Progressing  Flowsheets (Taken 10/15/2023 0431)  Discharge to home or other facility with appropriate resources:   Identify barriers to discharge with patient and caregiver   Arrange for needed discharge resources and transportation as appropriate   Identify discharge learning needs (meds, wound care, etc)   Refer to discharge planning if patient needs post-hospital services based on physician order or complex needs related to functional status, cognitive ability or social support system     Problem: Safety - Adult  Goal: Free from fall injury  Outcome: Progressing  Flowsheets (Taken 10/15/2023 0431)  Free From Fall Injury: Instruct family/caregiver on patient safety     Problem: Neurosensory - Adult  Goal: Achieves stable or improved neurological status  Outcome: Progressing  Flowsheets (Taken 10/15/2023 0431)  Achieves stable or improved neurological status: Assess for and report changes in neurological status  Goal: Absence of seizures  Outcome: Progressing  Flowsheets (Taken 10/15/2023 0431)  Absence of seizures:   Monitor for seizure activity.   If seizure occurs, document type and location of movements and any associated apnea   If seizure occurs, turn head to side and suction secretions as needed  Goal: Achieves maximal functionality and self care  Outcome: Progressing  Flowsheets (Taken 10/15/2023 0431)  Achieves maximal functionality and self care: Monitor swallowing and airway patency with patient fatigue and changes in neurological status     Problem: Musculoskeletal - Adult  Goal: Return mobility to safest level of function  Outcome: Progressing  Flowsheets (Taken 10/15/2023 0431)  Return Mobility to Safest Level of Function:   Assess patient stability and activity tolerance for standing, transferring and ambulating with or without assistive devices   Assist with transfers and ambulation using
RN  Outcome: Progressing  Flowsheets (Taken 10/15/2023 0431)  Achieves maximal functionality and self care: Monitor swallowing and airway patency with patient fatigue and changes in neurological status     Problem: Skin/Tissue Integrity - Adult  Goal: Skin integrity remains intact  10/15/2023 1512 by Elizabeth Mendieta RN  Outcome: Adequate for Discharge  10/15/2023 0431 by Miky Garza RN  Outcome: Progressing  Flowsheets (Taken 10/15/2023 0431)  Skin Integrity Remains Intact:   Monitor for areas of redness and/or skin breakdown   Assess vascular access sites hourly     Problem: Musculoskeletal - Adult  Goal: Return mobility to safest level of function  10/15/2023 1512 by Elizabeth Mendieta RN  Outcome: Adequate for Discharge  10/15/2023 0431 by Miky Garza RN  Outcome: Progressing  Flowsheets (Taken 10/15/2023 0431)  Return Mobility to Safest Level of Function:   Assess patient stability and activity tolerance for standing, transferring and ambulating with or without assistive devices   Assist with transfers and ambulation using safe patient handling equipment as needed   Ensure adequate protection for wounds/incisions during mobilization   Instruct patient/family in ordered activity level     Problem: Metabolic/Fluid and Electrolytes - Adult  Goal: Electrolytes maintained within normal limits  10/15/2023 1512 by Elizabeth Mendieta RN  Outcome: Adequate for Discharge  10/15/2023 0431 by Miky Garza RN  Outcome: Progressing  Flowsheets (Taken 10/15/2023 0431)  Electrolytes maintained within normal limits:   Monitor labs and assess patient for signs and symptoms of electrolyte imbalances   Administer electrolyte replacement as ordered     Problem: Hematologic - Adult  Goal: Maintains hematologic stability  10/15/2023 1512 by Elizabeth Mendieta RN  Outcome: Adequate for Discharge  10/15/2023 0431 by Miky Garza RN  Outcome: Progressing  Flowsheets (Taken 10/15/2023 0431)  Maintains hematologic stability:

## 2023-10-15 NOTE — DISCHARGE INSTRUCTIONS
F/U with Vince Dupont in 7-10 days  Rx: Tramadol 100 mg    Keep area near incision(s) clean and dry  Return to trauma clinic for suture removal in office    May shower at home after 24 hours  No dietary restrictions  No driving until no longer taking narcotics for pain and/or no longer guarding abdomen  Please use Milk of Magnesia liberally while taking pain meds

## 2023-10-15 NOTE — PROGRESS NOTES
Upon admission to [facility] patients shirt saturated in blood from fall. This nurse offered to let patient bathe and change into a clean hospital gown. Patient refused bath and clean gown choosing to remain in street clothes.

## 2023-10-15 NOTE — DISCHARGE SUMMARY
CT head and neck    Hospital Course: Admitted for EtOH intoxication. Discharge on the afternoon of 10/15 once sober. No seizure activity. Lucid and able to follow commands. Discharge Condition: stable    Disposition: home    Labs:    Discharge Instructions:  Discharge Medications:        Medication List        CONTINUE taking these medications      albuterol sulfate  (90 Base) MCG/ACT inhaler  Commonly known as: PROVENTIL;VENTOLIN;PROAIR  Inhale 1 puff into the lungs every 4 hours as needed for Wheezing or Shortness of Breath     fluticasone 50 MCG/ACT nasal spray  Commonly known as: Flonase  1 spray by Each Nostril route daily     ibuprofen 800 MG tablet  Commonly known as: ADVIL;MOTRIN  Take 1 tablet by mouth every 8 hours as needed for Pain or Fever     therapeutic multivitamin-minerals tablet              Diet: General/Regular diet as tolerated    Activity: activity as tolerated and no lifting more than 10-20 lbs for next two weeks    Wound Care: None     Follow-up:  in 4 weeks with No primary care provider on file. Follow up with Teagan Alejandra DO in 1 weeks.     Teagan Alejandra DO  Electronincally signed 10/15/2023 at 12:38 PM    A total of 41 minutes was spent in preparing the patient for discharge with greater than 50% of the time involved with education, counseling and coordinating care

## 2023-10-15 NOTE — ED NOTES
Pt resting quietly in bed at this time. VSS. Lights in room dimmed for comfort. Pt refuses to change into hospital gown. Call light in reach.      Ashley Hollis RN  10/15/23 2945

## 2023-10-17 ENCOUNTER — TELEPHONE (OUTPATIENT)
Dept: SURGERY | Age: 43
End: 2023-10-17

## 2023-10-17 NOTE — TELEPHONE ENCOUNTER
Pt called to make a Hosp f/u appt. Pt also requested a off work note for today.  Faxed to [phone]

## 2023-10-24 PROBLEM — R79.89 ELEVATED LFTS: Status: ACTIVE | Noted: 2018-03-15

## 2023-10-24 PROBLEM — R16.0 HEPATOMEGALY: Status: ACTIVE | Noted: 2018-03-15

## 2023-10-24 PROBLEM — G57.90 LOWER EXTREMITY NEUROPATHY: Status: ACTIVE | Noted: 2023-10-24

## 2023-10-27 ENCOUNTER — OFFICE VISIT (OUTPATIENT)
Dept: SURGERY | Age: 43
End: 2023-10-27
Payer: COMMERCIAL

## 2023-10-27 VITALS
HEIGHT: 66 IN | TEMPERATURE: 97 F | SYSTOLIC BLOOD PRESSURE: 108 MMHG | BODY MASS INDEX: 22.34 KG/M2 | WEIGHT: 139 LBS | RESPIRATION RATE: 18 BRPM | HEART RATE: 95 BPM | OXYGEN SATURATION: 100 % | DIASTOLIC BLOOD PRESSURE: 64 MMHG

## 2023-10-27 DIAGNOSIS — S01.01XD LACERATION OF SCALP, SUBSEQUENT ENCOUNTER: Primary | ICD-10-CM

## 2023-10-27 PROCEDURE — 99212 OFFICE O/P EST SF 10 MIN: CPT | Performed by: OCCUPATIONAL THERAPIST

## 2023-10-27 NOTE — PROGRESS NOTES
Trauma Clinic  [facility] [place]    Encounter Date: 10/27/2023  Patient:  Earnest Joel   Age: 36 y.o. YOB: 1980   MRN: [id number]      Injury Date:10/14/23    PCP: No primary care provider on file. Subjective   Chief Complaint:   Chief Complaint   Patient presents with    Trauma     S/p fall down stairs, Scalp laceration 10/15/23--Suture removal       History Obtained From: chart review and the patient  Reason for Admission: Active Problems:    * No active hospital problems. *  Resolved Problems:    * No resolved hospital problems. *    History of Present Illness:  She is a 36 y.o. female who presents to trauma clinic following fall down stairs with alcohol intoxication resulting in arterial scalp laceration. Complains of some brain fog, \"loopy\" feeling that is still resolving. Pt reports she has been well since admission, no complications post fall. She does still recall the incident despite her ethanol levels. States the next 2 days she had fatigue, was sleeping more, some difficulty with memory and concentration, and 1x episode of dizziness walking up the stairs day of discharge. She denies nausea and vomiting, irritation from screens, television phone and computer use but does report she can't listen to music at the normal level she prefers. States she is back to work, hasn't participated in strenuous activity or exercise. Has been taking ibuprofen for pain relief, didn't take the tramadol due to \"already feeling a little loopy\". Pain is well controlled today. Current smoker.        Review of Systems:   General: negative headache, dizziness, lightheadedness, fever, and chills  Cardiopulmonary: negative chest pain, shortness of breath, and cough  Gastrointestinal: negative abdominal pain, nausea, vomiting, diarrhea, blood in stool  Neurological: negative numbness, paraesthesias, and weakness  Musculoskeletal: Negative point tenderness, neck pain, and back pain    History   Other  Past

## 2024-03-18 ENCOUNTER — HOSPITAL ENCOUNTER (EMERGENCY)
Age: 44
Discharge: HOME | End: 2024-03-18
Payer: COMMERCIAL

## 2024-03-18 VITALS
WEIGHT: 135 LBS | HEART RATE: 88 BPM | SYSTOLIC BLOOD PRESSURE: 129 MMHG | DIASTOLIC BLOOD PRESSURE: 85 MMHG | TEMPERATURE: 98.1 F | OXYGEN SATURATION: 99 % | BODY MASS INDEX: 21.79 KG/M2 | RESPIRATION RATE: 14 BRPM

## 2024-03-18 DIAGNOSIS — J02.0 STREPTOCOCCAL SORE THROAT: Primary | ICD-10-CM

## 2024-03-18 LAB — S PYO AG THROAT QL: POSITIVE

## 2024-03-18 PROCEDURE — 99213 OFFICE O/P EST LOW 20 MIN: CPT | Performed by: NURSE PRACTITIONER

## 2024-03-18 PROCEDURE — 87651 STREP A DNA AMP PROBE: CPT

## 2024-03-18 PROCEDURE — 99213 OFFICE O/P EST LOW 20 MIN: CPT

## 2024-03-18 RX ORDER — AMOXICILLIN 500 MG/1
500 CAPSULE ORAL 2 TIMES DAILY
Qty: 14 CAPSULE | Refills: 0 | Status: SHIPPED | OUTPATIENT
Start: 2024-03-18 | End: 2024-03-25

## 2024-03-18 ASSESSMENT — PAIN - FUNCTIONAL ASSESSMENT: PAIN_FUNCTIONAL_ASSESSMENT: NONE - DENIES PAIN

## 2024-03-18 ASSESSMENT — ENCOUNTER SYMPTOMS: SORE THROAT: 1

## 2024-03-18 NOTE — DISCHARGE INSTRUCTIONS
Strep test is positive.  Take medications as prescribed.  Continue acetaminophen and ibuprofen as needed for pain. Use warm salt water gargles, cough drops, Chloraseptic spray.  Follow up with PCP in days if no better.  Meds as prescribed. Increase fluids. If worse return or go to ER.  Will provide work or school note as requested.

## 2024-04-27 ENCOUNTER — APPOINTMENT (OUTPATIENT)
Dept: GENERAL RADIOLOGY | Age: 44
End: 2024-04-27
Payer: COMMERCIAL

## 2024-04-27 ENCOUNTER — HOSPITAL ENCOUNTER (EMERGENCY)
Age: 44
Discharge: HOME | End: 2024-04-27
Payer: COMMERCIAL

## 2024-04-27 VITALS
TEMPERATURE: 97.2 F | OXYGEN SATURATION: 100 % | RESPIRATION RATE: 16 BRPM | HEART RATE: 93 BPM | SYSTOLIC BLOOD PRESSURE: 151 MMHG | DIASTOLIC BLOOD PRESSURE: 94 MMHG
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
## 2024-04-27 DIAGNOSIS — S70.02XA CONTUSION OF LEFT HIP, INITIAL ENCOUNTER: Primary | ICD-10-CM

## 2024-04-27 PROCEDURE — 99213 OFFICE O/P EST LOW 20 MIN: CPT

## 2024-04-27 PROCEDURE — 73552 X-RAY EXAM OF FEMUR 2/>: CPT

## 2024-04-27 ASSESSMENT — ENCOUNTER SYMPTOMS
VOMITING: 0
EYE REDNESS: 0
TROUBLE SWALLOWING: 0
NAUSEA: 0
SHORTNESS OF BREATH: 0
COUGH: 0
EYE DISCHARGE: 0
DIARRHEA: 0
SORE THROAT: 0
RHINORRHEA: 0
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
## 2024-04-27 NOTE — DISCHARGE INSTRUCTIONS
Imaging revealed no acute fractures or dislocations.  Recommend icing area.  May take Tylenol and ibuprofen as needed for pain control.  Follow-up in 7 to 10 days if symptoms worsen or fail to improve.
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
## 2024-05-28 ENCOUNTER — HOSPITAL ENCOUNTER (EMERGENCY)
Age: 44
Discharge: HOME | End: 2024-05-29
Payer: COMMERCIAL

## 2024-05-28 ENCOUNTER — APPOINTMENT (OUTPATIENT)
Dept: ULTRASOUND IMAGING | Age: 44
End: 2024-05-28
Payer: COMMERCIAL

## 2024-05-28 VITALS
BODY MASS INDEX: 21.38 KG/M2 | SYSTOLIC BLOOD PRESSURE: 104 MMHG | HEIGHT: 66 IN | DIASTOLIC BLOOD PRESSURE: 66 MMHG | HEART RATE: 94 BPM | RESPIRATION RATE: 16 BRPM | WEIGHT: 133 LBS | OXYGEN SATURATION: 98 % | TEMPERATURE: 98.2 F

## 2024-05-28 DIAGNOSIS — N93.8 DUB (DYSFUNCTIONAL UTERINE BLEEDING): Primary | ICD-10-CM

## 2024-05-28 DIAGNOSIS — E87.1 HYPONATREMIA: ICD-10-CM

## 2024-05-28 LAB
ANION GAP SERPL CALC-SCNC: 13 MEQ/L (ref 8–16)
B-HCG SERPL QL: NEGATIVE
BACTERIA URNS QL MICRO: ABNORMAL /HPF
BASOPHILS ABSOLUTE: 0 THOU/MM3 (ref 0–0.1)
BASOPHILS NFR BLD AUTO: 0.4 %
BILIRUB UR QL STRIP.AUTO: NEGATIVE
BUN SERPL-MCNC: 4 MG/DL (ref 7–22)
CALCIUM SERPL-MCNC: 9.1 MG/DL (ref 8.5–10.5)
CASTS #/AREA URNS LPF: ABNORMAL /LPF
CASTS 2: ABNORMAL /LPF
CHARACTER UR: CLEAR
CHLORIDE SERPL-SCNC: 93 MEQ/L (ref 98–111)
CO2 SERPL-SCNC: 23 MEQ/L (ref 23–33)
COLOR: COLORLESS
CREAT SERPL-MCNC: 0.4 MG/DL (ref 0.4–1.2)
CRYSTALS URNS MICRO: ABNORMAL
DEPRECATED RDW RBC AUTO: 41.9 FL (ref 35–45)
EOSINOPHIL NFR BLD AUTO: 2.2 %
EOSINOPHILS ABSOLUTE: 0.1 THOU/MM3 (ref 0–0.4)
EPITHELIAL CELLS, UA: ABNORMAL /HPF
ERYTHROCYTE [DISTWIDTH] IN BLOOD BY AUTOMATED COUNT: 11.6 % (ref 11.5–14.5)
GFR SERPL CREATININE-BSD FRML MDRD: > 90 ML/MIN/1.73M2
GLUCOSE SERPL-MCNC: 86 MG/DL (ref 70–108)
GLUCOSE UR QL STRIP.AUTO: NEGATIVE MG/DL
HCT VFR BLD AUTO: 42.6 % (ref 37–47)
HGB BLD-MCNC: 14.9 GM/DL (ref 12–16)
HGB UR QL STRIP.AUTO: ABNORMAL
IMM GRANULOCYTES # BLD AUTO: 0.02 THOU/MM3 (ref 0–0.07)
IMM GRANULOCYTES NFR BLD AUTO: 0.3 %
KETONES UR QL STRIP.AUTO: NEGATIVE
LYMPHOCYTES ABSOLUTE: 2 THOU/MM3 (ref 1–4.8)
LYMPHOCYTES NFR BLD AUTO: 29.7 %
MCH RBC QN AUTO: 34.2 PG (ref 26–33)
MCHC RBC AUTO-ENTMCNC: 35 GM/DL (ref 32.2–35.5)
MCV RBC AUTO: 97.7 FL (ref 81–99)
MISCELLANEOUS 2: ABNORMAL
MONOCYTES ABSOLUTE: 0.5 THOU/MM3 (ref 0.4–1.3)
MONOCYTES NFR BLD AUTO: 7.4 %
MUCOUS THREADS URNS QL MICRO: ABNORMAL
NEUTROPHILS ABSOLUTE: 4.1 THOU/MM3 (ref 1.8–7.7)
NEUTROPHILS NFR BLD AUTO: 60 %
NITRITE UR QL STRIP: NEGATIVE
NRBC BLD AUTO-RTO: 0 /100 WBC
OSMOLALITY SERPL CALC.SUM OF ELEC: 255.1 MOSMOL/KG (ref 275–300)
PH UR STRIP.AUTO: 7 [PH] (ref 5–9)
PLATELET # BLD AUTO: 187 THOU/MM3 (ref 130–400)
PMV BLD AUTO: 9.8 FL (ref 9.4–12.4)
POTASSIUM SERPL-SCNC: 3.4 MEQ/L (ref 3.5–5.2)
PROT UR STRIP.AUTO-MCNC: NEGATIVE MG/DL
RBC # BLD AUTO: 4.36 MILL/MM3 (ref 4.2–5.4)
RBC URINE: ABNORMAL /HPF
RENAL EPI CELLS #/AREA URNS HPF: ABNORMAL /[HPF]
SODIUM SERPL-SCNC: 129 MEQ/L (ref 135–145)
SP GR UR REFRACT.AUTO: < 1.005 (ref 1–1.03)
UROBILINOGEN, URINE: 0.2 EU/DL (ref 0–1)
WBC # BLD AUTO: 6.8 THOU/MM3 (ref 4.8–10.8)
WBC #/AREA URNS HPF: ABNORMAL /HPF
WBC #/AREA URNS HPF: NEGATIVE /[HPF]
YEAST LIKE FUNGI URNS QL MICRO: ABNORMAL

## 2024-05-28 PROCEDURE — 99284 EMERGENCY DEPT VISIT MOD MDM: CPT

## 2024-05-28 PROCEDURE — 36415 COLL VENOUS BLD VENIPUNCTURE: CPT

## 2024-05-28 PROCEDURE — 81001 URINALYSIS AUTO W/SCOPE: CPT

## 2024-05-28 PROCEDURE — 85025 COMPLETE CBC W/AUTO DIFF WBC: CPT

## 2024-05-28 PROCEDURE — 80048 BASIC METABOLIC PNL TOTAL CA: CPT

## 2024-05-28 PROCEDURE — 84703 CHORIONIC GONADOTROPIN ASSAY: CPT

## 2024-05-28 PROCEDURE — 76830 TRANSVAGINAL US NON-OB: CPT

## 2024-05-28 ASSESSMENT — PAIN - FUNCTIONAL ASSESSMENT: PAIN_FUNCTIONAL_ASSESSMENT: NONE - DENIES PAIN

## 2024-05-29 RX ORDER — TRANEXAMIC ACID 650 MG/1
1300 TABLET ORAL 3 TIMES DAILY
Qty: 30 TABLET | Refills: 0 | Status: SHIPPED | OUTPATIENT
Start: 2024-05-29 | End: 2024-06-03

## 2024-05-29 NOTE — ED TRIAGE NOTES
PT to the ED with vaginal bleeding. PT states that she started back on the birth control pill about a month ago due to an ovarian cyst. PT states that she had her period for about 2 weeks and today she has had increased bleeding. PT states she has been bleeding through her tampon and pad every 15 minutes. PT denies any pain. PT denies any dizziness. Respirations equal and unlabored. PT ambulatory to room.

## 2024-05-29 NOTE — DISCHARGE INSTRUCTIONS
Call today or tomorrow to follow up with Family Medicine Clinic or your OB / GYN in 5 days    Potential miscarriage has not been ruled out in your case of pregnancy.   Use ibuprofen or Tylenol (unless prescribed medications that have Tylenol in it) for pain.  You can take over the counter Ibuprofen (advil) tablets (4 tablets every 8 hours or 3 tablets every 6 hours or 2 tablets every 4 hours)    Return to the Emergency Department for worsening of vaginal bleeding, using more than 4 pads per hour, feeling of weakness, dizzy, nausea / vomiting, any other care or concern.

## 2024-06-03 ENCOUNTER — HOSPITAL ENCOUNTER (EMERGENCY)
Age: 44
Discharge: HOME | End: 2024-06-03
Payer: COMMERCIAL

## 2024-06-03 VITALS
OXYGEN SATURATION: 96 % | RESPIRATION RATE: 16 BRPM | TEMPERATURE: 98.1 F | HEIGHT: 66 IN | WEIGHT: 135 LBS | HEART RATE: 88 BPM | DIASTOLIC BLOOD PRESSURE: 79 MMHG | SYSTOLIC BLOOD PRESSURE: 111 MMHG | BODY MASS INDEX: 21.69 KG/M2

## 2024-06-03 DIAGNOSIS — J01.40 ACUTE NON-RECURRENT PANSINUSITIS: Primary | ICD-10-CM

## 2024-06-03 DIAGNOSIS — J01.00 ACUTE NON-RECURRENT MAXILLARY SINUSITIS: ICD-10-CM

## 2024-06-03 PROCEDURE — 99213 OFFICE O/P EST LOW 20 MIN: CPT

## 2024-06-03 RX ORDER — ALBUTEROL SULFATE 90 UG/1
1 AEROSOL, METERED RESPIRATORY (INHALATION) EVERY 4 HOURS PRN
Qty: 1 EACH | Refills: 1 | Status: SHIPPED | OUTPATIENT
Start: 2024-06-03

## 2024-06-03 RX ORDER — AZITHROMYCIN 250 MG/1
TABLET, FILM COATED ORAL
Qty: 6 TABLET | Refills: 0 | Status: SHIPPED | OUTPATIENT
Start: 2024-06-03 | End: 2024-06-13

## 2024-06-03 ASSESSMENT — ENCOUNTER SYMPTOMS
SINUS PRESSURE: 1
COUGH: 0
SORE THROAT: 0
EYE REDNESS: 0
SHORTNESS OF BREATH: 0
SINUS PAIN: 1
NAUSEA: 0
VOMITING: 0
EYE DISCHARGE: 0
RHINORRHEA: 1
DIARRHEA: 0
TROUBLE SWALLOWING: 0

## 2024-06-03 ASSESSMENT — PAIN - FUNCTIONAL ASSESSMENT: PAIN_FUNCTIONAL_ASSESSMENT: 0-10

## 2024-06-03 ASSESSMENT — PAIN DESCRIPTION - LOCATION: LOCATION: NOSE;FACE;HEAD

## 2024-06-03 ASSESSMENT — PAIN DESCRIPTION - DESCRIPTORS: DESCRIPTORS: ACHING;PRESSURE

## 2024-06-03 ASSESSMENT — PAIN DESCRIPTION - PAIN TYPE: TYPE: ACUTE PAIN

## 2024-06-03 ASSESSMENT — PAIN SCALES - GENERAL: PAINLEVEL_OUTOF10: 4

## 2024-06-03 ASSESSMENT — PAIN DESCRIPTION - FREQUENCY: FREQUENCY: CONTINUOUS

## 2024-06-03 ASSESSMENT — PAIN DESCRIPTION - ONSET: ONSET: GRADUAL

## 2024-06-03 NOTE — DISCHARGE INSTRUCTIONS
Prescription for azithromycin.  Use Zyrtec, Flonase, and Mucinex D.   Drink plenty of fluids to thin mucus.  Sleep with your head propped up on a pillow.  Inhale steam three of four times daily( exp: sit in bathroom with hot shower running.)  Avoid Trigger and not smoking.  May also clean nasal passages with salt water to ease congestion. Use over-the-counter Tylenol and Motrin for pain or fever.  Refill of albuterol sent.  follow-up with their PCP or [facility] in 3 to 5 days and worsening symptoms.

## 2024-06-03 NOTE — ED TRIAGE NOTES
Arrives to [facility] for the evaluation of possible sinus infection due to having sinus congestion/pressure, sinus drainage, sinus headache, post nasal drip, sore throat, and occasional cough.  Symptoms started 2 days ago.  Afebrile.  Reports having yellow drainage from nose.  Headache and sinus pressure rated 4/10 in severity.  Did have chills through the night.  Started taking Zicam from symptoms.  Waiting provider to assess.

## 2024-06-06 NOTE — ED PROVIDER NOTES
disposition:     4) MIPS  N/A                    Medical Decision Making  The patient is seen in the ER abnormal and prolonged uterine bleeding.  Appropriate labs and imaging are ordered and reviewed.  Labs show a mild hyponatremia, likely secondary to beer consumption.  Other labs are reassuring.  US shows small amount of fluid in the uterine cavity that is likely the blood in the uterus.  Reviewed findings with the patient.  No fibroids, no cysts noted.  Patient will be prescribed Lysteda and instructed to continue with follow up with her GYN.  Patient is agreeable.        Vitals Reviewed:    Vitals:    05/28/24 2107 05/28/24 2153 05/28/24 2355   BP: (!) 141/93 121/82 104/66   Pulse: 89 88 94   Resp: 16 18 16   Temp: 98.2 °F (36.8 °C)     TempSrc: Oral     SpO2: 96% 96% 98%   Weight: 60.3 kg (133 lb)     Height: 1.676 m (5' 6\")         The patient was seen and examined. Appropriate diagnostic testing was performed and results reviewed with the patient.         The results of pertinent diagnostic studies and exam findings were discussed. The patient’s provisional diagnosis and plan of care were discussed with the patient and present family who expressed understanding and agreement with the POC. Any medications were reviewed and indications and risks of medications were discussed with the patient /family present. Strict verbal and written return precautions, instructions and appropriate follow-up provided to  the patient.   Patient was DISCHARGED from the hospital. Based on the reassuring ED workup and patient's stable vital signs, I feel the patient may be safely discharged home. At this point in time, I believe the patient has the mental capacity to make medical decisions.      No notes of EC Admission Criteria type on file.        Patient was seen independently by myself. The patient's final impression and disposition and plan was determined by myself.     Strict return precautions and follow up instructions were

## 2024-08-05 NOTE — ED PROVIDER NOTES
Patient: Lesli Escobar  YOB: 1963  Date: 8/5/2024 Time: 7:10 PM  Location: [facility] Emergency Department    Leaving the Hospital Against Medical Advice    Chart #:[id number]    This will certify that I, the undersigned,    ______________________________________________________________________    A patient in the above named medical center, having requested discharge and removal from the medical [place] against the advice of my attending physician(s), hereby release [facility], its physicians, officers and employees, severally and individually, from any and all liability of any nature whatsoever for any injury or harm or complication of any kind that may result directly or indirectly, by reason of my terminating my stay as a patient at [facility] Emergency [facility] and my departure from [facility], and hereby waive any and all rights of action I may now have or later acquire as a result of my voluntary departure from [facility] and the termination of my stay as a patient therein.    This release is made with the full knowledge of the danger that may result from the action which I am taking.      Date:_______________________                         ___________________________                                                                                    Patient/Legal Representative    Witness:        ____________________________                          ___________________________  Nurse                                                                        Physician     [facility] URGENT CARE  UrgentCare Encounter      CHIEFCOMPLAINT     No chief complaint on file.      Nurses Notes reviewed and I agree except as noted in the HPI.  HISTORY OF PRESENT ILLNESS   Lavonne Luis is a 43 y.o. female who presents to urgent care with complaints of sore throat and mild fatigue.  Symptom onset was approximately 2 days ago.    REVIEW OF SYSTEMS     Review of Systems   Constitutional:  Positive for fatigue.   HENT:  Positive for sore throat.        PAST MEDICAL HISTORY         Diagnosis Date    Alcohol abuse 6/18/2021    Anxiety     Hereditary and idiopathic peripheral neuropathy 6/15/2021    Neuropathy     feet and legs at night        SURGICAL HISTORY     Patient  has a past surgical history that includes ovarian cyst drainage.    CURRENT MEDICATIONS       Previous Medications    ALBUTEROL SULFATE HFA (PROVENTIL;VENTOLIN;PROAIR) 108 (90 BASE) MCG/ACT INHALER    Inhale 1 puff into the lungs every 4 hours as needed for Wheezing or Shortness of Breath    FLUTICASONE (FLONASE) 50 MCG/ACT NASAL SPRAY    1 spray by Each Nostril route daily    IBUPROFEN (ADVIL;MOTRIN) 800 MG TABLET    Take 1 tablet by mouth every 8 hours as needed for Pain or Fever    MULTIPLE VITAMINS-MINERALS (THERAPEUTIC MULTIVITAMIN-MINERALS) TABLET    Take 1 tablet by mouth daily       ALLERGIES     Patient is is allergic to other.    FAMILY HISTORY     Patient'sfamily history includes Asthma in her father; Bipolar Disorder in her father; COPD in her father; Hypothyroidism in her mother; Thyroid Disease in her mother.    SOCIAL HISTORY     Patient  reports that she has been smoking cigarettes. She has a 10.0 pack-year smoking history. She has never used smokeless tobacco. She reports current alcohol use. She reports that she does not use drugs.    PHYSICAL EXAM     ED TRIAGE VITALS  BP: 129/85, Temp: 98.1 °F (36.7 °C), Pulse: 88, Respirations: 14, SpO2: 99 %  Physical Exam  Vitals and nursing note reviewed.